# Patient Record
Sex: FEMALE | Race: WHITE | NOT HISPANIC OR LATINO
[De-identification: names, ages, dates, MRNs, and addresses within clinical notes are randomized per-mention and may not be internally consistent; named-entity substitution may affect disease eponyms.]

---

## 2021-04-01 PROBLEM — Z00.00 ENCOUNTER FOR PREVENTIVE HEALTH EXAMINATION: Status: ACTIVE | Noted: 2021-04-01

## 2021-04-08 ENCOUNTER — APPOINTMENT (OUTPATIENT)
Dept: SURGERY | Facility: CLINIC | Age: 61
End: 2021-04-08
Payer: COMMERCIAL

## 2021-04-08 VITALS
TEMPERATURE: 98 F | DIASTOLIC BLOOD PRESSURE: 82 MMHG | OXYGEN SATURATION: 98 % | BODY MASS INDEX: 28.79 KG/M2 | SYSTOLIC BLOOD PRESSURE: 157 MMHG | RESPIRATION RATE: 16 BRPM | HEIGHT: 68 IN | WEIGHT: 190 LBS | HEART RATE: 99 BPM

## 2021-04-08 DIAGNOSIS — F32.9 ANXIETY DISORDER, UNSPECIFIED: ICD-10-CM

## 2021-04-08 DIAGNOSIS — Z80.3 FAMILY HISTORY OF MALIGNANT NEOPLASM OF BREAST: ICD-10-CM

## 2021-04-08 DIAGNOSIS — Z80.0 FAMILY HISTORY OF MALIGNANT NEOPLASM OF DIGESTIVE ORGANS: ICD-10-CM

## 2021-04-08 DIAGNOSIS — Z86.69 PERSONAL HISTORY OF OTHER DISEASES OF THE NERVOUS SYSTEM AND SENSE ORGANS: ICD-10-CM

## 2021-04-08 DIAGNOSIS — Z80.42 FAMILY HISTORY OF MALIGNANT NEOPLASM OF PROSTATE: ICD-10-CM

## 2021-04-08 DIAGNOSIS — Z82.49 FAMILY HISTORY OF ISCHEMIC HEART DISEASE AND OTHER DISEASES OF THE CIRCULATORY SYSTEM: ICD-10-CM

## 2021-04-08 DIAGNOSIS — F41.9 ANXIETY DISORDER, UNSPECIFIED: ICD-10-CM

## 2021-04-08 PROCEDURE — 46600 DIAGNOSTIC ANOSCOPY SPX: CPT

## 2021-04-08 PROCEDURE — 99072 ADDL SUPL MATRL&STAF TM PHE: CPT

## 2021-04-08 PROCEDURE — 99203 OFFICE O/P NEW LOW 30 MIN: CPT | Mod: 25

## 2021-04-08 NOTE — CONSULT LETTER
[Dear  ___] : Dear ~ARLIN, [Consult Letter:] : I had the pleasure of evaluating your patient, [unfilled]. [Please see my note below.] : Please see my note below. [Consult Closing:] : Thank you very much for allowing me to participate in the care of this patient.  If you have any questions, please do not hesitate to contact me. [Sincerely,] : Sincerely, [DrGemma  ___] : Dr. DC [FreeTextEntry2] : Dr. Carmine Rodriguez [FreeTextEntry3] : Reese Maciel M.D., FABIO.[email or web address]., F.[email or web address].S.ANAIRGemmaS.\[facility] Chief Colorectal Clinical Services, [facility]

## 2021-04-08 NOTE — ASSESSMENT
[FreeTextEntry1] : I have seen and evaluated patient and have corroborated all nursing input into this note.  Patient with grade 4 hemorrhoid prolapse and large external tags.  Unfortunately surgery is the only successful treatment option for this degree of hemorrhoid pathology.  Indications, risk, benefits, alternatives for hemorrhoidectomy reviewed including but not limited to bleeding, infection, recurrence, pain, tags, fissure, fistula, incontinence, and stenosis.  All questions were answered. The patient stated that she would like to schedule the surgery and she will make the arrangements with my office

## 2021-04-08 NOTE — PHYSICAL EXAM
[Thyroid] : the thyroid was normal [Normal Breath Sounds] : Normal breath sounds [Normal Heart Sounds] : normal heart sounds [Normal Rate and Rhythm] : normal rate and rhythm [No Edema] : No edema [No Rash or Lesion] : No rash or lesion [Alert] : alert [Oriented to Person] : oriented to person [Oriented to Place] : oriented to place [Oriented to Time] : oriented to time [Anxious] : anxious [Abdomen Masses] : No abdominal masses [Abdomen Tenderness] : ~T No ~M abdominal tenderness [JVD] : no jugular venous distention  [Wheezing] : no wheezing was heard [de-identified] : Appears well nourished, in no acute distress [de-identified] : WNL [de-identified] : Full ROM [FreeTextEntry1] : Perianal inspection demonstrated external tags and prolapse of the right anterior hemorrhoid with associated mucosal prolapse.  The prolapsing tissue was partially reducible.  Anoscopy confirmed large internal hemorrhoids with squamous metaplasia consistent with prolapse.

## 2021-04-08 NOTE — HISTORY OF PRESENT ILLNESS
[FreeTextEntry1] : The patient is a 60 year old female here for an evaluation of rectal bleeding/pain. Patient is s/p hemorrhoidectomy 30 years ago. Patient reports intermittent BRBPR that can sometimes occur even without bowel movements. Wears a pad daily but only sees blood about once a week. Also reports prolapsing hemorrhoidal tissue that she attempts to manually reduce but is unsuccessful in doing so. Denies rectal pain. Takes a stool softener every day. Has 1-2 normal formed BMs daily. \par The patient had a colonoscopy on 2/22/21 by Dr. Bruno Arizmendi. Findings demonstrate: Rectal prolapse, distal 5 mm sessile hyperplastic polyp of size 5 mm. Polypectomy done by cold snare. Proximal: 1 cm sessile, flat hyperplastic polyp biopsy taken. Site tattooed x2cc. Flat 3mm hyperplastic polyp, excision biopsy at 15 cm. Flat polyp 2 mm, excision biopsy at 5 cm. FIrst degree internal hemorrhoids. Sessile 3 mm polyp in the sigmoid. Pedunculated 6 mm polyp in the descending colon (Tubular adenoma).

## 2021-04-27 ENCOUNTER — OUTPATIENT (OUTPATIENT)
Dept: OUTPATIENT SERVICES | Facility: HOSPITAL | Age: 61
LOS: 1 days | End: 2021-04-27
Payer: COMMERCIAL

## 2021-04-27 VITALS
OXYGEN SATURATION: 97 % | RESPIRATION RATE: 18 BRPM | DIASTOLIC BLOOD PRESSURE: 74 MMHG | HEIGHT: 68 IN | WEIGHT: 197.98 LBS | TEMPERATURE: 99 F | SYSTOLIC BLOOD PRESSURE: 168 MMHG | HEART RATE: 94 BPM

## 2021-04-27 DIAGNOSIS — Z01.818 ENCOUNTER FOR OTHER PREPROCEDURAL EXAMINATION: ICD-10-CM

## 2021-04-27 DIAGNOSIS — Z98.890 OTHER SPECIFIED POSTPROCEDURAL STATES: Chronic | ICD-10-CM

## 2021-04-27 DIAGNOSIS — K64.3 FOURTH DEGREE HEMORRHOIDS: ICD-10-CM

## 2021-04-27 DIAGNOSIS — K64.4 RESIDUAL HEMORRHOIDAL SKIN TAGS: ICD-10-CM

## 2021-04-27 PROCEDURE — G0463: CPT

## 2021-04-27 NOTE — H&P PST ADULT - NSICDXPASTSURGICALHX_GEN_ALL_CORE_FT
PAST SURGICAL HISTORY:  H/O colonoscopy     H/O hemorrhoidectomy 1990    Status post subacromial decompression left shoulder, 1999

## 2021-04-27 NOTE — H&P PST ADULT - CHARACTER OF SYSTOLIC MURMUR
Mr. Phelps,    It was a pleasure seeing you for your physical examination.  I wanted to get back to you with your test results.  I have enclosed a copy for your review.      I am happy to report that your cbc or complete blood count is normal with no signs of anemia, leukemia or platelet abnormalities.  Your chemistry panel shows no signs of diabetes.  Your blood salts, kidney tests, liver tests, hiv test, urine, and proteins are all fine.    Your total cholesterol is 173 with the normal range being below 200.  Your HDL or good cholesterol is 63 with the normal range being above 40.  Your LDL or bad cholesterol is 90 with the normal range being below 130.  These numbers are very good.    I am happy to bring you this overall excellent report.  Please get the ultrasound and ct and let's see what those show.  If you have any questions please call me.    Joey Mike M.D.                   murmur loudness: I/VI/upper lsb

## 2021-04-27 NOTE — H&P PST ADULT - NSICDXPASTMEDICALHX_GEN_ALL_CORE_FT
PAST MEDICAL HISTORY:  Cardiac murmur     Dense breasts     Hemorrhoids     Osteopenia     Precancerous lesion Polyp    Vision loss right eye, sees peripheral vision only

## 2021-04-27 NOTE — H&P PST ADULT - NSICDXFAMILYHX_GEN_ALL_CORE_FT
FAMILY HISTORY:  Mother  Still living? Unknown  Family history of valvular heart disease, Age at diagnosis: Age Unknown  FH: breast cancer, Age at diagnosis: Age Unknown  FH: type 2 diabetes, Age at diagnosis: Age Unknown    Grandparent  Still living? Unknown  FH: type 2 diabetes, Age at diagnosis: Age Unknown

## 2021-04-27 NOTE — H&P PST ADULT - ENMT COMMENTS
right lower tooth s/p root canal on 4/27/21 d/t "chipped tooth"; denies infection not on antibiotics

## 2021-04-27 NOTE — H&P PST ADULT - NSANTHOSAYNRD_GEN_A_CORE
No. ARIAS screening performed.  STOP BANG Legend: 0-2 = LOW Risk; 3-4 = INTERMEDIATE Risk; 5-8 = HIGH Risk

## 2021-04-27 NOTE — H&P PST ADULT - HISTORY OF PRESENT ILLNESS
59 yo female in NAD presents to PST prior to scheduled Hemorrhoidectomy on 5/4/21 with Dr. Reese Maciel. PMHx includes heart murmur, palpitations, hemorrhoids s/p hemorrhoidectomy (1990). osteopenia, anxiety/depression. Patient reports she had a colonoscopy recently which revealed precancerous polyps (which were removed) and hemorrhoids. Patient reports intermittent rectal bleeding after defecation. She denies fevers, chills, chest pain, melena, abdominal pain, nausea, vomiting. She was recently evaluated by Dr. Maciel and the above surgery was recommended.      Cardiac evaluation with Dr. Piper Griffin.  Medical evaluation with Dr. Nikita Jean Baptiste - patient had labs drawn w/ PCP; elevated calcium d/t supplementation. Patient discontinued calcium and went to get labs repeated on 4/26/21.    COVID19 PCR 5/1/21 at [facility].

## 2021-04-27 NOTE — H&P PST ADULT - DOES PATIENT HAVE ADVANCE DIRECTIVE
CC providers:  Annamarie Sosa MD  [address]  [address]  [facility] [address]  VIA In [place] No

## 2021-04-27 NOTE — H&P PST ADULT - NSICDXPROBLEM_GEN_ALL_CORE_FT
PROBLEM DIAGNOSES  Problem: Fourth degree hemorrhoids  Assessment and Plan: Hemorrhoidectomy on 5/4/21 with Dr. Reese Maciel.  Pre-op instructions given. Questions answered.  COVID19 PCR at [facility] on 5/1/21.  Medical and cardiac eval prior to surgery.

## 2021-05-04 ENCOUNTER — APPOINTMENT (OUTPATIENT)
Dept: SURGERY | Facility: HOSPITAL | Age: 61
End: 2021-05-04

## 2022-02-16 PROBLEM — R01.1 CARDIAC MURMUR, UNSPECIFIED: Chronic | Status: ACTIVE | Noted: 2021-04-27

## 2022-02-16 PROBLEM — R92.2 INCONCLUSIVE MAMMOGRAM: Chronic | Status: ACTIVE | Noted: 2021-04-27

## 2022-02-16 PROBLEM — M85.80 OTHER SPECIFIED DISORDERS OF BONE DENSITY AND STRUCTURE, UNSPECIFIED SITE: Chronic | Status: ACTIVE | Noted: 2021-04-27

## 2022-02-16 PROBLEM — H54.7 UNSPECIFIED VISUAL LOSS: Chronic | Status: ACTIVE | Noted: 2021-04-27

## 2022-03-09 ENCOUNTER — APPOINTMENT (OUTPATIENT)
Dept: SURGERY | Facility: CLINIC | Age: 62
End: 2022-03-09
Payer: COMMERCIAL

## 2022-03-09 VITALS
WEIGHT: 190 LBS | HEIGHT: 68 IN | HEART RATE: 81 BPM | RESPIRATION RATE: 18 BRPM | BODY MASS INDEX: 28.79 KG/M2 | SYSTOLIC BLOOD PRESSURE: 164 MMHG | DIASTOLIC BLOOD PRESSURE: 81 MMHG | TEMPERATURE: 97.6 F | OXYGEN SATURATION: 95 %

## 2022-03-09 PROCEDURE — 46600 DIAGNOSTIC ANOSCOPY SPX: CPT

## 2022-03-09 PROCEDURE — 99214 OFFICE O/P EST MOD 30 MIN: CPT | Mod: 25

## 2022-03-09 RX ORDER — ZINC SULFATE 50(220)MG
CAPSULE ORAL
Refills: 0 | Status: ACTIVE | COMMUNITY

## 2022-03-09 RX ORDER — WARFARIN 4 MG/1
TABLET ORAL
Refills: 0 | Status: ACTIVE | COMMUNITY

## 2022-03-09 RX ORDER — AMLODIPINE BESYLATE 5 MG/1
5 TABLET ORAL
Refills: 0 | Status: ACTIVE | COMMUNITY

## 2022-03-09 RX ORDER — DOCUSATE SODIUM 100 MG/1
100 CAPSULE ORAL
Refills: 0 | Status: ACTIVE | COMMUNITY

## 2022-03-09 RX ORDER — METOPROLOL SUCCINATE 200 MG/1
200 TABLET, EXTENDED RELEASE ORAL
Refills: 0 | Status: ACTIVE | COMMUNITY

## 2022-03-09 RX ORDER — ZINC SULFATE 50(220)MG
CAPSULE ORAL
Refills: 0 | Status: DISCONTINUED | COMMUNITY
End: 2022-03-09

## 2022-03-09 RX ORDER — ASPIRIN 81 MG
81 TABLET, DELAYED RELEASE (ENTERIC COATED) ORAL
Refills: 0 | Status: ACTIVE | COMMUNITY

## 2022-03-09 NOTE — HISTORY OF PRESENT ILLNESS
[FreeTextEntry1] : Urszula is a 61 year old female here for follow up visit.\par \par Colonoscopy on 2/22/21 by Dr. Ugo Carr. - Rectal prolapse, distal 5 mm sessile hyperplastic polyp of size 5 mm. Polypectomy done by cold snare. Proximal: 1 cm sessile, flat hyperplastic polyp biopsy taken. Site tattooed x2cc. Flat 3mm hyperplastic polyp, excision biopsy at 15 cm. Flat polyp 2 mm, excision biopsy at 5 cm. FIrst degree internal hemorrhoids. Sessile 3 mm polyp in the sigmoid. Pedunculated 6 mm polyp in the descending colon. \par Pathology; A. rectum, distal, polyp, polypectomy- hyperplastic polyp. B. colon, descending polyp, polypectomy- tubular adenoma. C. colon, sigmoid, polyp, excision biopsy- mucosal polyp with hyperplastic changes, no adenomatous changes seen. D. Rectum, proximal, biopsy- hyperplastic polyp. E. Rectum, polyp at 15 cm from anal verge, excision biopsy- hyperplastic polyp. F. Rectum, polyp 5 cm from anal verge, excision- hyperplastic polyp. \par  \par Last seen 4/8/21 - I have seen and evaluated patient and have corroborated all nursing input into this note. Patient with grade 4 hemorrhoid prolapse and large external tags. Unfortunately surgery is the only successful treatment option for this degree of hemorrhoid pathology. Indications, risk, benefits, alternatives for hemorrhoidectomy reviewed including but not limited to bleeding, infection, recurrence, pain, tags, fissure, fistula, incontinence, and stenosis. All questions were answered. The patient stated that she would like to schedule the surgery and she will make the arrangements with my office.\par \par Today pt reports discomfort. 1-2 formed BMs daily, no straining, takes Colace daily, no pain, occasional BRB dripping into toilet and on toilet paper. Last episode of rectal bleeding 3/9/22. Occasional BRB found in underwear, wears pad for protection. Denies episodes of incontinence. Occasional tissue popping out that goes back itself. External swollen tissue the size of a grape, occasional burning/ itching discomfort. Good appetite, no nausea, no vomiting. Denies fever and chills. Pt on Warfarin and Aspirin 81 mg.

## 2022-03-09 NOTE — ASSESSMENT
[FreeTextEntry1] : Patient with severe hemorrhoid symptoms.  Unfortunately surgery is her only option.  The significant risk of postoperative bleeding while on anticoagulation was discussed.  The patient is aware this risk continues for 2 to 3 weeks after surgery and that she would need to seek emergent medical attention at the hospital which is closest to her if bleeding occurs.  Other indications, risk, benefits, alternatives for hemorrhoidectomy reviewed including but not limited to infection, recurrence, pain, tags, fissure, fistula, stenosis, and anal incontinence.  All questions answered.

## 2022-03-09 NOTE — PHYSICAL EXAM
[FreeTextEntry1] : Perianal inspection demonstrated large external tags.  Digital exam and anoscopy confirmed large internal hemorrhoids with extensive squamous metaplasia consistent with prolapse.\par \par \par \par Anoscopy performed to evaluate internal hemorrhoids.  No sedation required.

## 2022-03-22 ENCOUNTER — TRANSCRIPTION ENCOUNTER (OUTPATIENT)
Age: 62
End: 2022-03-22

## 2022-03-29 ENCOUNTER — OUTPATIENT (OUTPATIENT)
Dept: OUTPATIENT SERVICES | Facility: HOSPITAL | Age: 62
LOS: 1 days | End: 2022-03-29
Payer: COMMERCIAL

## 2022-03-29 ENCOUNTER — APPOINTMENT (OUTPATIENT)
Age: 62
End: 2022-03-29
Payer: COMMERCIAL

## 2022-03-29 VITALS
OXYGEN SATURATION: 95 % | DIASTOLIC BLOOD PRESSURE: 71 MMHG | HEIGHT: 68 IN | HEART RATE: 86 BPM | TEMPERATURE: 98 F | WEIGHT: 214.95 LBS | SYSTOLIC BLOOD PRESSURE: 125 MMHG | RESPIRATION RATE: 18 BRPM

## 2022-03-29 DIAGNOSIS — I10 ESSENTIAL (PRIMARY) HYPERTENSION: ICD-10-CM

## 2022-03-29 DIAGNOSIS — Z86.79 PERSONAL HISTORY OF OTHER DISEASES OF THE CIRCULATORY SYSTEM: ICD-10-CM

## 2022-03-29 DIAGNOSIS — K64.4 RESIDUAL HEMORRHOIDAL SKIN TAGS: ICD-10-CM

## 2022-03-29 DIAGNOSIS — Z95.2 PRESENCE OF PROSTHETIC HEART VALVE: Chronic | ICD-10-CM

## 2022-03-29 DIAGNOSIS — Z95.2 PRESENCE OF PROSTHETIC HEART VALVE: ICD-10-CM

## 2022-03-29 DIAGNOSIS — K64.3 FOURTH DEGREE HEMORRHOIDS: ICD-10-CM

## 2022-03-29 DIAGNOSIS — R01.1 CARDIAC MURMUR, UNSPECIFIED: ICD-10-CM

## 2022-03-29 DIAGNOSIS — Z01.818 ENCOUNTER FOR OTHER PREPROCEDURAL EXAMINATION: ICD-10-CM

## 2022-03-29 DIAGNOSIS — E78.5 HYPERLIPIDEMIA, UNSPECIFIED: ICD-10-CM

## 2022-03-29 DIAGNOSIS — Z98.890 OTHER SPECIFIED POSTPROCEDURAL STATES: Chronic | ICD-10-CM

## 2022-03-29 DIAGNOSIS — Z86.79 OTHER SPECIFIED POSTPROCEDURAL STATES: ICD-10-CM

## 2022-03-29 DIAGNOSIS — K62.5 HEMORRHAGE OF ANUS AND RECTUM: ICD-10-CM

## 2022-03-29 DIAGNOSIS — Z98.890 OTHER SPECIFIED POSTPROCEDURAL STATES: ICD-10-CM

## 2022-03-29 DIAGNOSIS — K62.89 OTHER SPECIFIED DISEASES OF ANUS AND RECTUM: ICD-10-CM

## 2022-03-29 DIAGNOSIS — Z78.9 OTHER SPECIFIED HEALTH STATUS: ICD-10-CM

## 2022-03-29 PROCEDURE — G0463: CPT

## 2022-03-29 PROCEDURE — 99204 OFFICE O/P NEW MOD 45 MIN: CPT

## 2022-03-29 RX ORDER — LISINOPRIL 30 MG/1
TABLET ORAL
Refills: 0 | Status: ACTIVE | COMMUNITY

## 2022-03-29 RX ORDER — SERTRALINE 25 MG/1
TABLET, FILM COATED ORAL
Refills: 0 | Status: ACTIVE | COMMUNITY

## 2022-03-29 RX ORDER — CALCIUM CARBONATE/VITAMIN D3 600 MG-20
TABLET ORAL
Refills: 0 | Status: DISCONTINUED | COMMUNITY
End: 2022-03-29

## 2022-03-29 RX ORDER — TIZANIDINE 2 MG/1
2 TABLET ORAL
Refills: 0 | Status: ACTIVE | COMMUNITY

## 2022-03-29 RX ORDER — UBIDECARENONE/VIT E ACET 100MG-5
CAPSULE ORAL
Refills: 0 | Status: ACTIVE | COMMUNITY

## 2022-03-29 RX ORDER — CALCIUM CARBONATE 500(1250)
2 TABLET ORAL
Qty: 0 | Refills: 0 | DISCHARGE
End: 2021-04-20

## 2022-03-29 RX ORDER — L.ACIDOPH/B.ANIMALIS/B.LONGUM 15B CELL
1 CAPSULE ORAL
Qty: 0 | Refills: 0 | DISCHARGE

## 2022-03-29 RX ORDER — DOCUSATE SODIUM 100 MG/1
TABLET ORAL
Refills: 0 | Status: DISCONTINUED | COMMUNITY
End: 2022-03-29

## 2022-03-29 NOTE — H&P PST ADULT - PROBLEM SELECTOR PLAN 2
Pt will be bridging with Lovenox. Schedule no coumadin 3/29/22 start Lovenox 3/30-4/3 BID  4/4 only AM dose no Lovenox on 4/5

## 2022-03-29 NOTE — H&P PST ADULT - HISTORY OF PRESENT ILLNESS
61yr old female with grade 4 prolapsed internal hemorrhoids with rectal bleeding and pain. Coming in for IR for angiogram internal hemorrhoidal embolization. 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 Pt will be bridging with Lovenox for surgery.  Medical eval on chart. Hx of obesity HTN controlled HLD.    Note; covid test 4/2/22 [facility] 61yr old female with grade 4 prolapsed internal hemorrhoids with rectal bleeding and pain. Coming in for IR for angiogram internal hemorrhoidal embolization. 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 Pt will be bridging with Lovenox for surgery.  Medical eval on chart. Hx of obesity HTN controlled HLD. Endocarditis prophylaxis ordered.    Note; [facility]

## 2022-03-29 NOTE — H&P PST ADULT - NSICDXPASTMEDICALHX_GEN_ALL_CORE_FT
PAST MEDICAL HISTORY:  Anxiety     Cardiac murmur     Dense breasts     Hemorrhoids grade 4 internal prolapsed    Hyperlipidemia     Obesity     Osteopenia     Precancerous lesion Polyp removed colon 2/2021    Vision loss right eye, sees peripheral vision only

## 2022-03-29 NOTE — H&P PST ADULT - FALL HARM RISK - UNIVERSAL INTERVENTIONS
Bed in lowest position, wheels locked, appropriate side rails in place/Call bell, personal items and telephone in reach/Instruct patient to call for assistance before getting out of bed or chair/Non-slip footwear when patient is out of bed/[place] to call system/Physically safe environment - no spills, clutter or unnecessary equipment/Purposeful Proactive Rounding/Room/bathroom lighting operational, light cord in reach

## 2022-03-29 NOTE — H&P PST ADULT - NSICDXPASTSURGICALHX_GEN_ALL_CORE_FT
PAST SURGICAL HISTORY:  H/O colonoscopy     H/O hemorrhoidectomy 1991    History of aortic valve replacement ascending aorta and aortic arch aneurysm  5/17/2021    Status post subacromial decompression left shoulder, 1999

## 2022-04-01 PROBLEM — F41.9 ANXIETY DISORDER, UNSPECIFIED: Chronic | Status: ACTIVE | Noted: 2022-03-29

## 2022-04-01 PROBLEM — D49.9 NEOPLASM OF UNSPECIFIED BEHAVIOR OF UNSPECIFIED SITE: Chronic | Status: ACTIVE | Noted: 2021-04-27

## 2022-04-01 PROBLEM — K64.9 UNSPECIFIED HEMORRHOIDS: Chronic | Status: ACTIVE | Noted: 2021-04-27

## 2022-04-01 PROBLEM — E66.9 OBESITY, UNSPECIFIED: Chronic | Status: ACTIVE | Noted: 2022-03-29

## 2022-04-01 PROBLEM — E78.5 HYPERLIPIDEMIA, UNSPECIFIED: Chronic | Status: ACTIVE | Noted: 2022-03-29

## 2022-04-02 ENCOUNTER — OUTPATIENT (OUTPATIENT)
Dept: OUTPATIENT SERVICES | Facility: HOSPITAL | Age: 62
LOS: 1 days | End: 2022-04-02
Payer: COMMERCIAL

## 2022-04-02 DIAGNOSIS — Z95.2 PRESENCE OF PROSTHETIC HEART VALVE: Chronic | ICD-10-CM

## 2022-04-02 DIAGNOSIS — Z98.890 OTHER SPECIFIED POSTPROCEDURAL STATES: Chronic | ICD-10-CM

## 2022-04-02 DIAGNOSIS — Z11.52 ENCOUNTER FOR SCREENING FOR COVID-19: ICD-10-CM

## 2022-04-02 LAB — SARS-COV-2 RNA SPEC QL NAA+PROBE: SIGNIFICANT CHANGE UP

## 2022-04-02 PROCEDURE — U0005: CPT

## 2022-04-02 PROCEDURE — U0003: CPT

## 2022-04-02 PROCEDURE — C9803: CPT

## 2022-04-05 ENCOUNTER — APPOINTMENT (OUTPATIENT)
Dept: SURGERY | Facility: HOSPITAL | Age: 62
End: 2022-04-05

## 2022-04-05 ENCOUNTER — OUTPATIENT (OUTPATIENT)
Dept: OUTPATIENT SERVICES | Facility: HOSPITAL | Age: 62
LOS: 1 days | End: 2022-04-05
Payer: COMMERCIAL

## 2022-04-05 ENCOUNTER — RESULT REVIEW (OUTPATIENT)
Age: 62
End: 2022-04-05

## 2022-04-05 ENCOUNTER — TRANSCRIPTION ENCOUNTER (OUTPATIENT)
Age: 62
End: 2022-04-05

## 2022-04-05 VITALS
SYSTOLIC BLOOD PRESSURE: 122 MMHG | HEART RATE: 63 BPM | DIASTOLIC BLOOD PRESSURE: 65 MMHG | RESPIRATION RATE: 16 BRPM | OXYGEN SATURATION: 96 %

## 2022-04-05 VITALS
HEART RATE: 76 BPM | OXYGEN SATURATION: 96 % | HEIGHT: 68 IN | RESPIRATION RATE: 18 BRPM | WEIGHT: 210.1 LBS | SYSTOLIC BLOOD PRESSURE: 141 MMHG | TEMPERATURE: 98 F | DIASTOLIC BLOOD PRESSURE: 72 MMHG

## 2022-04-05 DIAGNOSIS — K62.5 HEMORRHAGE OF ANUS AND RECTUM: ICD-10-CM

## 2022-04-05 DIAGNOSIS — K64.3 FOURTH DEGREE HEMORRHOIDS: ICD-10-CM

## 2022-04-05 DIAGNOSIS — Z98.890 OTHER SPECIFIED POSTPROCEDURAL STATES: Chronic | ICD-10-CM

## 2022-04-05 DIAGNOSIS — Z95.2 PRESENCE OF PROSTHETIC HEART VALVE: Chronic | ICD-10-CM

## 2022-04-05 DIAGNOSIS — K64.4 RESIDUAL HEMORRHOIDAL SKIN TAGS: ICD-10-CM

## 2022-04-05 DIAGNOSIS — K62.89 OTHER SPECIFIED DISEASES OF ANUS AND RECTUM: ICD-10-CM

## 2022-04-05 PROCEDURE — C1887: CPT

## 2022-04-05 PROCEDURE — C1769: CPT

## 2022-04-05 PROCEDURE — 37244 VASC EMBOLIZE/OCCLUDE BLEED: CPT

## 2022-04-05 PROCEDURE — 76937 US GUIDE VASCULAR ACCESS: CPT

## 2022-04-05 PROCEDURE — C2628: CPT

## 2022-04-05 PROCEDURE — 76937 US GUIDE VASCULAR ACCESS: CPT | Mod: 26

## 2022-04-05 PROCEDURE — 36247 INS CATH ABD/L-EXT ART 3RD: CPT

## 2022-04-05 PROCEDURE — C1760: CPT

## 2022-04-05 PROCEDURE — C1894: CPT

## 2022-04-05 RX ORDER — DOCUSATE SODIUM 100 MG
1 CAPSULE ORAL
Qty: 0 | Refills: 0 | DISCHARGE

## 2022-04-05 RX ORDER — ONDANSETRON 8 MG/1
4 TABLET, FILM COATED ORAL ONCE
Refills: 0 | Status: DISCONTINUED | OUTPATIENT
Start: 2022-04-05 | End: 2022-04-19

## 2022-04-05 RX ORDER — ACETAMINOPHEN 500 MG
1000 TABLET ORAL ONCE
Refills: 0 | Status: DISCONTINUED | OUTPATIENT
Start: 2022-04-05 | End: 2022-04-19

## 2022-04-05 RX ORDER — TIZANIDINE 4 MG/1
2 TABLET ORAL
Qty: 0 | Refills: 0 | DISCHARGE

## 2022-04-05 NOTE — ASU DISCHARGE PLAN (ADULT/PEDIATRIC) - NURSING INSTRUCTIONS
Please feel free to contact us at [phone] if any problems arise. After 6PM, Monday through Friday, on weekends and on holidays, please call [phone] and ask for the radiology resident on call to be paged.

## 2022-04-05 NOTE — ASU PATIENT PROFILE, ADULT - VISION (WITH CORRECTIVE LENSES IF THE PATIENT USUALLY WEARS THEM):
Normal vision: sees adequately in most situations; can see medication labels, newsprint
As certified below, I, or a nurse practitioner or physician assistant working with me, had a face-to-face encounter that meets the physician face-to-face encounter requirements.

## 2022-04-05 NOTE — ASU DISCHARGE PLAN (ADULT/PEDIATRIC) - NS MD DC FALL RISK RISK
For information on Fall & Injury Prevention, visit: [email or web address] OR  [email or web address] OR  https://www.cdc.gov/steadi/patient.html

## 2022-04-05 NOTE — ASU DISCHARGE PLAN (ADULT/PEDIATRIC) - B. DRINK ALCOHOL, BEER, OR WINE
Problem: Thermoregulation  Goal: Body temperature maintained within normal range  Outcome: Outcome Met, Continue evaluating goal progress toward completion  Infant was in 27 degree isolette when arrived for shift at 0700. Transitioned infant to open crib at 1300 as temps were stable and infant is >1800g. Although infant is not yet 34 weeks, MD is okay with infant in open crib. Temps since raising the top of the isolette have been stable.       Statement Selected

## 2022-04-05 NOTE — PROCEDURE NOTE - PROCEDURE FINDINGS AND DETAILS
Patent right common femoral artery. ALEXANDR catheterized and angiography confirms presence of internal hemorrhoids. Embolization performed with microcoils. Right groin hemostasis with Celt device and manual compression.

## 2022-04-05 NOTE — ASU DISCHARGE PLAN (ADULT/PEDIATRIC) - ASU DC SPECIAL INSTRUCTIONSFT
You underwent a Internal Hemorrhoidal Embolization procedure with Dr Small on 4/5/2022.    Follow-up Visits:  - Please call the Interventional Radiology office @ [phone] to schedule a two week follow up consultation with your Dr. Small. Due to the current pandemic, this may be conducted via Telehealth services, for your safety.      Questions or Concerns:  - Should you have any questions or concerns regarding the above, please call IR at [phone] or [phone] Mon - Fri 8 am -4 pm.  - If you develop a problem that you believe requires IMMEDIATE attention, please go to your NEAREST Emergency Room or call 911***   - If you believe your problem can safely wait until you speak to a Doctor, please call your Urologist & IR at [phone]. After 6 pm on weekdays, or on weekends or holidays, please call [facility] at [phone] and ask for the" Interventional Radiology Resident on call"  -FEVER of 101 F   -SEVERE ABDOMINAL PAIN UNRELIEVED BY PAIN MEDICATION  -PERSISTENT NAUSEA OR VOMITING or the inability to tolerate liquids or solid foods   -RIGHT GROIN  ACCESS SITE BLEEDING / expanding bruising / severe pain / hardened area (like a golf ball)    -RIGHT LEG NUMBNESS, tingling or inability to move your leg

## 2022-04-05 NOTE — PRE PROCEDURE NOTE - PRE PROCEDURE EVALUATION
Interventional Radiology Pre-Procedure Note    Diagnosis/Indication: Patient is a 61y old  Female who presents with persistent bleeding from internal hemorrhoids. Patient not a surgical candidate due to need for anticoagulation due to mechanical valve.     PAST MEDICAL & SURGICAL HISTORY:  Hemorrhoids  grade 4 internal prolapsed    Osteopenia    Dense breasts    Cardiac murmur    Precancerous lesion  Polyp removed colon 2/2021    Vision loss  right eye, sees peripheral vision only    Obesity    Anxiety    Hyperlipidemia    Status post subacromial decompression  left shoulder, 1999    H/O colonoscopy    H/O hemorrhoidectomy  1991    History of aortic valve replacement  ascending aorta and aortic arch aneurysm  5/17/2021         Allergies: No Known Drug Allergies  walnut (Hives)      LABS:  Reviewed in chart.             Procedure/ risks/ benefits were explained, informed consent obtained from patient, verbalizes understanding.

## 2022-04-05 NOTE — ASU PATIENT PROFILE, ADULT - FALL HARM RISK - UNIVERSAL INTERVENTIONS
Bed in lowest position, wheels locked, appropriate side rails in place/Call bell, personal items and telephone in reach/Instruct patient to call for assistance before getting out of bed or chair/Non-slip footwear when patient is out of bed/[place] to call system/Physically safe environment - no spills, clutter or unnecessary equipment/Purposeful Proactive Rounding/Room/bathroom lighting operational, light cord in reach

## 2022-04-15 DIAGNOSIS — K62.5 HEMORRHAGE OF ANUS AND RECTUM: ICD-10-CM

## 2022-04-19 ENCOUNTER — APPOINTMENT (OUTPATIENT)
Age: 62
End: 2022-04-19
Payer: COMMERCIAL

## 2022-04-19 PROCEDURE — 99442: CPT | Mod: 95

## 2022-05-24 ENCOUNTER — APPOINTMENT (OUTPATIENT)
Age: 62
End: 2022-05-24

## 2022-05-24 DIAGNOSIS — Z87.19 PERSONAL HISTORY OF OTHER DISEASES OF THE DIGESTIVE SYSTEM: ICD-10-CM

## 2022-05-24 DIAGNOSIS — K64.9 UNSPECIFIED HEMORRHOIDS: ICD-10-CM

## 2022-05-24 PROCEDURE — XXXXX: CPT | Mod: 1L

## 2022-05-24 NOTE — PHYSICAL EXAM
[Alert] : alert [No Acute Distress] : no acute distress [Normal Voice/Communication] : normal voice communication [Oriented x3] : oriented to person, place, and time [Normal Insight/Judgement] : insight and judgment were intact [Normal Affect] : the affect was normal [Normal Mood] : the mood was normal [Fully active, able to carry on all pre-disease performance without restriction] : Fully active, able to carry on all pre-disease performance without restriction

## 2022-06-06 ENCOUNTER — NON-APPOINTMENT (OUTPATIENT)
Age: 62
End: 2022-06-06

## 2022-06-07 ENCOUNTER — APPOINTMENT (OUTPATIENT)
Age: 62
End: 2022-06-07

## 2022-06-28 NOTE — HISTORY OF PRESENT ILLNESS
[FreeTextEntry1] : This is a 61 year old female with hx of Aortic Valve replacement (mechanical) on 5/17/21 on coumadin & asa, HTN, HLD, grade 4 prolapsed internal hemorrhoids with rectal bleeding & pain, who presents to IR for f/u consultation s/p angiogram with superior hemorrhoidal artery embolization on 4/5/22.\par \par Ms. Oneill reports that she is still bleeding after bowel movements not precipitated by straining, and also has to wear pads in between bowel movements due to intermittent spotting. Pt reporting intermittent rectal pain/hemorrhoid pain when sitting down, relieved when she sits on a donut.\par \par Patient denies fever, chills, nausea vomiting, shortness of breath, light headedness/dizziness. \par \par \par Colorectal Sx Dr Maciel\par Cardiac Dr Piper Griffin

## 2022-06-28 NOTE — ASSESSMENT
[Other: _____] : [unfilled] [FreeTextEntry1] : This is a 61 year old female with hx of Aortic Valve replacement (mechanical) on 5/17/21 on coumadin & asa, HTN, HLD, grade 4 prolapsed internal hemorrhoids with rectal bleeding & pain, who presents to IR for f/u consultation s/p superior hemorrhoidal artery embolization on 4/5/22.\par \par # Internal Hemorrhoids \par - now s/p superior hemorrhoidal artery embolization on 4/5/22\par - reports minimal improvement- reports that she is still bleeding after bowel movements not precipitated by straining, with associated intermittent rectal pain/hemorrhoid pain.\par - deemed high risk for delayed hemorrhage should hemorrhoidectomy be performed as per Dr Maciel 2/2 need for continuation of anticoagulation regimen for Afib\par - possible need for repeat angiogram +/- internal hemorrhoidal embolization vs surgery\par - pt will follow up with her cardiologist regarding AC management and associated risk for surgery vs repeat angio/embo \par - continue with colorectal f/u as per Dr Maciel\par - will follow up with patient in 2 weeks \par \par Ms. Nino comprehension was confirmed & all questions were asked and answered to her satisfaction.\par IR office contact information was reviewed with the pt should she have any questions, issues or concerns, in the interim. \par

## 2022-06-28 NOTE — REASON FOR VISIT
[Follow-Up: _____] : a [unfilled] follow-up visit [FreeTextEntry1] :  s/p superior hemorrhoidal artery embolization

## 2022-08-18 ENCOUNTER — APPOINTMENT (OUTPATIENT)
Dept: SURGERY | Facility: CLINIC | Age: 62
End: 2022-08-18

## 2022-08-18 VITALS
OXYGEN SATURATION: 98 % | HEART RATE: 85 BPM | SYSTOLIC BLOOD PRESSURE: 147 MMHG | RESPIRATION RATE: 17 BRPM | TEMPERATURE: 97.3 F | DIASTOLIC BLOOD PRESSURE: 79 MMHG

## 2022-08-18 PROCEDURE — 99214 OFFICE O/P EST MOD 30 MIN: CPT | Mod: 25

## 2022-08-18 PROCEDURE — 46600 DIAGNOSTIC ANOSCOPY SPX: CPT

## 2022-08-18 NOTE — HISTORY OF PRESENT ILLNESS
[FreeTextEntry1] : Urszula is a 61 year old female here for follow up visit, to discuss sx. \par \par Colonoscopy on 2/22/21 by Dr. Ugo Carr. - Rectal prolapse, distal 5 mm sessile hyperplastic polyp of size 5 mm. Polypectomy done by cold snare. Proximal: 1 cm sessile, flat hyperplastic polyp biopsy taken. Site tattooed x2cc. Flat 3mm hyperplastic polyp, excision biopsy at 15 cm. Flat polyp 2 mm, excision biopsy at 5 cm. FIrst degree internal hemorrhoids. Sessile 3 mm polyp in the sigmoid. Pedunculated 6 mm polyp in the descending colon. \par \par Pathology; A. rectum, distal, polyp, polypectomy- hyperplastic polyp. B. colon, descending polyp, polypectomy- tubular adenoma. C. colon, sigmoid, polyp, excision biopsy- mucosal polyp with hyperplastic changes, no adenomatous changes seen. D. Rectum, proximal, biopsy- hyperplastic polyp. E. Rectum, polyp at 15 cm from anal verge, excision biopsy- hyperplastic polyp. F. Rectum, polyp 5 cm from anal verge, excision- hyperplastic polyp. \par  \par Last seen on 3/9/22 - Perianal inspection demonstrated large external tags. Digital exam and anoscopy confirmed large internal hemorrhoids with extensive squamous metaplasia consistent with prolapse.\par Anoscopy performed to evaluate internal hemorrhoids. No sedation required.Patient with severe hemorrhoid symptoms. Unfortunately surgery is her only option. The significant risk of postoperative bleeding while on anticoagulation was discussed. The patient is aware this risk continues for 2 to 3 weeks after surgery and that she would need to seek emergent medical attention at the hospital which is closest to her if bleeding occurs. Other indications, risk, benefits, alternatives for hemorrhoidectomy reviewed including but not limited to infection, recurrence, pain, tags, fissure, fistula, stenosis, and anal incontinence. All questions answered. \par  \par Today pt reports 7/10 pain. Daily BMs, soft, no straining, bleeding dripping on toilet and dripping in bath tub at times with pain for 3 years, leakage of stool at times, and feeling swollen tissue on outside. Had hemorrhoids removed 30 years through lasers. Sitz bath and Tylenol with relief. Denies nausea and vomiting. Denies fever and chills. Good appetite. Baby aspirin and warfarin for artificial valve.

## 2022-08-18 NOTE — PHYSICAL EXAM
[FreeTextEntry1] : Perianal inspection demonstrated external tags.  Moderate internal hemorrhoids noted on both digital exam and anoscopy.\par \par \par Anoscopy performed to evaluate internal hemorrhoids.  No sedation required

## 2022-08-18 NOTE — ASSESSMENT
[FreeTextEntry1] : Patient's hemorrhoids reduced completely on today's visit.  Patient reports that she still gets severe swelling and bleeding despite embolization.  She also has intermittent severe pain associated with the prolapse.  Since patient's hemorrhoids are now reducible and are only moderate in size when reduced I recommended a trial of sclerotherapy.  Indications, risk, benefits, alternatives reviewed including but not limited to bleeding, infection, and failure discussed.  Ultimately, if sclerotherapy fails then surgery would be needed.  The risk of postoperative bleeding and multiple episodes of postoperative bleeding requiring emergency suture placement were discussed.  The patient's  was present for the discussion and all questions were answered.  Patient will take antibiotic prophylaxis prior to sclerotherapy.

## 2022-09-01 ENCOUNTER — APPOINTMENT (OUTPATIENT)
Dept: SURGERY | Facility: CLINIC | Age: 62
End: 2022-09-01

## 2022-09-01 VITALS
HEART RATE: 83 BPM | TEMPERATURE: 97.3 F | RESPIRATION RATE: 17 BRPM | DIASTOLIC BLOOD PRESSURE: 80 MMHG | SYSTOLIC BLOOD PRESSURE: 138 MMHG | OXYGEN SATURATION: 95 %

## 2022-09-01 PROCEDURE — 46500 INJECTION INTO HEMORRHOID(S): CPT

## 2022-09-01 NOTE — PHYSICAL EXAM
[FreeTextEntry1] : Perianal inspection revealed external tags.  There was no prolapse on today's exam.  Digital exam and anoscopy confirmed large internal hemorrhoids.\par \par \par Anoscopy performed to evaluate internal hemorrhoids and to perform the sclerotherapy.  No sedation required.

## 2022-09-01 NOTE — HISTORY OF PRESENT ILLNESS
[FreeTextEntry1] : Urszula is a 61 year old female here for sclerotherapy. \par \par Colonoscopy on 2/22/21 by Dr. Ugo Carr. - Rectal prolapse, distal 5 mm sessile hyperplastic polyp of size 5 mm. Polypectomy done by cold snare. Proximal: 1 cm sessile, flat hyperplastic polyp biopsy taken. Site tattooed x2cc. Flat 3mm hyperplastic polyp, excision biopsy at 15 cm. Flat polyp 2 mm, excision biopsy at 5 cm. FIrst degree internal hemorrhoids. Sessile 3 mm polyp in the sigmoid. Pedunculated 6 mm polyp in the descending colon. \par Pathology; A. rectum, distal, polyp, polypectomy- hyperplastic polyp. B. colon, descending polyp, polypectomy- tubular adenoma. C. colon, sigmoid, polyp, excision biopsy- mucosal polyp with hyperplastic changes, no adenomatous changes seen. D. Rectum, proximal, biopsy- hyperplastic polyp. E. Rectum, polyp at 15 cm from anal verge, excision biopsy- hyperplastic polyp. F. Rectum, polyp 5 cm from anal verge, excision- hyperplastic polyp. \par  \par Last seen 8/18/22- Perianal inspection demonstrated external tags. Moderate internal hemorrhoids noted on both digital exam and anoscopy. Anoscopy performed to evaluate internal hemorrhoids. No sedation required. Patient's hemorrhoids reduced completely on today's visit. Patient reports that she still gets severe swelling and bleeding despite embolization. She also has intermittent severe pain associated with the prolapse. Since patient's hemorrhoids are now reducible and are only moderate in size when reduced I recommended a trial of sclerotherapy. Indications, risk, benefits, alternatives reviewed including but not limited to bleeding, infection, and failure discussed. Ultimately, if sclerotherapy fails then surgery would be needed. The risk of postoperative bleeding and multiple episodes of postoperative bleeding requiring emergency suture placement were discussed. The patient's  was present for the discussion and all questions were answered. Patient will take antibiotic prophylaxis prior to sclerotherapy. \par \par Today pt reports 7/10 pain, takes tylenol with relief. Daily BMs, takes stool softener daily, soft, no straining, with bleeding on tp and dripping on bowl (even without BM), no episodes of incontinence, and does feeling swollen tissue and prolapsed tissue (cant be pushed in even with Tucks). Denies nausea and vomiting. Denies fever and chills. Good appetite. Taking Warfarin for artificial heart valve. Took amoxicillin before coming.

## 2022-09-01 NOTE — ASSESSMENT
[FreeTextEntry1] : 1 cc of 5% phenol in cottonseed oil injected into each of the 3 primary hemorrhoids.  Patient will call if any increase in pain fevers or difficulty urinating.  Follow-up in 3 weeks if bleeding continues.

## 2022-09-19 ENCOUNTER — TRANSCRIPTION ENCOUNTER (OUTPATIENT)
Age: 62
End: 2022-09-19

## 2022-09-22 ENCOUNTER — APPOINTMENT (OUTPATIENT)
Dept: SURGERY | Facility: CLINIC | Age: 62
End: 2022-09-22

## 2022-09-29 ENCOUNTER — APPOINTMENT (OUTPATIENT)
Dept: SURGERY | Facility: CLINIC | Age: 62
End: 2022-09-29

## 2022-09-29 VITALS
HEART RATE: 82 BPM | RESPIRATION RATE: 17 BRPM | DIASTOLIC BLOOD PRESSURE: 86 MMHG | SYSTOLIC BLOOD PRESSURE: 157 MMHG | TEMPERATURE: 96.8 F | OXYGEN SATURATION: 94 %

## 2022-09-29 PROCEDURE — 46500 INJECTION INTO HEMORRHOID(S): CPT

## 2022-09-29 NOTE — PHYSICAL EXAM
[FreeTextEntry1] : External tags.  Anoscopy with large internal hemorrhoids.\par \par Anoscopy performed to evaluate internal hemorrhoids and perform sclerotherapy.  No sedation required.

## 2022-09-29 NOTE — ASSESSMENT
[FreeTextEntry1] : Status post embolization and 1 round of sclerotherapy with some improvement.  Repeat sclerotherapy performed today by injecting 1 cc of 5% phenol in cottonseed oil into each hemorrhoid.  Monsel solution applied.  Follow-up as needed.

## 2022-09-29 NOTE — HISTORY OF PRESENT ILLNESS
[FreeTextEntry1] : Urszula is a 61 year old female here for a follow up visit. \par \par Colonoscopy on 2/22/21 by Dr. Ugo Carr. - Rectal prolapse, distal 5 mm sessile hyperplastic polyp of size 5 mm. Polypectomy done by cold snare. Proximal: 1 cm sessile, flat hyperplastic polyp biopsy taken. Site tattooed x2cc. Flat 3mm hyperplastic polyp, excision biopsy at 15 cm. Flat polyp 2 mm, excision biopsy at 5 cm. FIrst degree internal hemorrhoids. Sessile 3 mm polyp in the sigmoid. Pedunculated 6 mm polyp in the descending colon. \par Pathology; A. rectum, distal, polyp, polypectomy- hyperplastic polyp. B. colon, descending polyp, polypectomy- tubular adenoma. C. colon, sigmoid, polyp, excision biopsy- mucosal polyp with hyperplastic changes, no adenomatous changes seen. D. Rectum, proximal, biopsy- hyperplastic polyp. E. Rectum, polyp at 15 cm from anal verge, excision biopsy- hyperplastic polyp. F. Rectum, polyp 5 cm from anal verge, excision- hyperplastic polyp. \par  \par Last seen on 9/1/22 - Perianal inspection revealed external tags. There was no prolapse on today's exam. Digital exam and anoscopy confirmed large internal hemorrhoids. Anoscopy performed to evaluate internal hemorrhoids and to perform the sclerotherapy. No sedation required. 1 cc of 5% phenol in cottonseed oil injected into each of the 3 primary hemorrhoids. Patient will call if any increase in pain fevers or difficulty urinating. Follow-up in 3 weeks if bleeding continues.  \par \par Today pt reports no pain. Daily BMs, soft, no straining, with bleeding dripping onto bowel, on tp, on underwear, no episodes of incontinence, and does feel prolapsed tissue. Denies nausea and vomiting. Denies fever and chills. Good appetite. Previous sclerotherapy worked for about 10 days but bleeding started again. Less pain and burning. Takes warfarin and baby aspirin for artificial heart valve. Also took amoxicillin today. \par

## 2022-11-03 ENCOUNTER — APPOINTMENT (OUTPATIENT)
Dept: SURGERY | Facility: CLINIC | Age: 62
End: 2022-11-03

## 2023-03-08 ENCOUNTER — TRANSCRIPTION ENCOUNTER (OUTPATIENT)
Age: 63
End: 2023-03-08

## 2023-03-09 ENCOUNTER — TRANSCRIPTION ENCOUNTER (OUTPATIENT)
Age: 63
End: 2023-03-09

## 2023-03-29 ENCOUNTER — APPOINTMENT (OUTPATIENT)
Dept: SURGERY | Facility: CLINIC | Age: 63
End: 2023-03-29
Payer: COMMERCIAL

## 2023-03-29 PROCEDURE — 46500 INJECTION INTO HEMORRHOID(S): CPT

## 2023-03-29 PROCEDURE — 99213 OFFICE O/P EST LOW 20 MIN: CPT | Mod: 25

## 2023-03-29 NOTE — PHYSICAL EXAM
[FreeTextEntry1] : Perianal inspection demonstrated tags and internal hemorrhoid prolapse.  Digital exam and anoscopy confirmed large internal hemorrhoids.\par \par Anoscopy performed to evaluate internal hemorrhoids and perform sclerotherapy.  No sedation required.

## 2023-03-29 NOTE — HISTORY OF PRESENT ILLNESS
[FreeTextEntry1] : Urszula is a 61 year old female here for a follow up visit, sclerotherapy\par \par s/p sclerotherapy 09/01/22, 09/29/22 \par \par Colonoscopy on 2/22/21 by Dr. Ugo Carr. - Rectal prolapse, distal 5 mm sessile hyperplastic polyp of size 5 mm. Polypectomy done by cold snare. Proximal: 1 cm sessile, flat hyperplastic polyp biopsy taken. Site tattooed x2cc. Flat 3mm hyperplastic polyp, excision biopsy at 15 cm. Flat polyp 2 mm, excision biopsy at 5 cm. FIrst degree internal hemorrhoids. Sessile 3 mm polyp in the sigmoid. Pedunculated 6 mm polyp in the descending colon. \par Pathology; A. rectum, distal, polyp, polypectomy- hyperplastic polyp. B. colon, descending polyp, polypectomy- tubular adenoma. C. colon, sigmoid, polyp, excision biopsy- mucosal polyp with hyperplastic changes, no adenomatous changes seen. D. Rectum, proximal, biopsy- hyperplastic polyp. E. Rectum, polyp at 15 cm from anal verge, excision biopsy- hyperplastic polyp. F. Rectum, polyp 5 cm from anal verge, excision- hyperplastic polyp. \par \par Last seen 09/29/22 - Status post embolization and 1 round of sclerotherapy with some improvement. Repeat sclerotherapy performed today by injecting 1 cc of 5% phenol in cottonseed oil into each hemorrhoid. Monsel solution applied. Follow-up as needed. \par \par Today pt reports occasional anorectal pain. Daily BMs, soft, no straining, with bleeding dripping onto bowel, on tp, on underwear around Thanksgiving of last year.  The rectal bleeding happened 2-3 times a week, last rectal bleeding was this morning into toilet bowl.  No episodes of incontinence and does feel prolapse tissue.  Denies nausea and vomiting. Denies fever and chills. Good appetite.  Takes warfarin and baby aspirin for artificial heart valve. Also took amoxicillin today.\par \par  05/05/2022 hemorrhoid artery embolization.\par \par \par

## 2023-03-29 NOTE — ASSESSMENT
[FreeTextEntry1] : Patient with recurrent bleeding.  I recommended repeat sclerotherapy.  Patient has had 1 hemorrhoidal artery embolization and 2 prior treatments with sclerotherapy.  Patient agreed.  1 cc of 5% phenol in cottonseed oil was injected into the 3 primary hemorrhoids.  Monsel solution applied.  Follow-up as needed.

## 2023-09-20 ENCOUNTER — APPOINTMENT (OUTPATIENT)
Dept: SURGERY | Facility: CLINIC | Age: 63
End: 2023-09-20
Payer: COMMERCIAL

## 2023-09-20 PROCEDURE — 99214 OFFICE O/P EST MOD 30 MIN: CPT

## 2023-09-28 ENCOUNTER — APPOINTMENT (OUTPATIENT)
Dept: INTERVENTIONAL RADIOLOGY/VASCULAR | Facility: CLINIC | Age: 63
End: 2023-09-28

## 2023-09-28 PROCEDURE — XXXXX: CPT | Mod: 1L

## 2023-10-30 ENCOUNTER — OUTPATIENT (OUTPATIENT)
Dept: OUTPATIENT SERVICES | Facility: HOSPITAL | Age: 63
LOS: 1 days | End: 2023-10-30
Payer: COMMERCIAL

## 2023-10-30 VITALS
WEIGHT: 210.1 LBS | SYSTOLIC BLOOD PRESSURE: 117 MMHG | HEART RATE: 75 BPM | RESPIRATION RATE: 18 BRPM | TEMPERATURE: 98 F | OXYGEN SATURATION: 97 % | DIASTOLIC BLOOD PRESSURE: 74 MMHG | HEIGHT: 68 IN

## 2023-10-30 DIAGNOSIS — Z98.890 OTHER SPECIFIED POSTPROCEDURAL STATES: Chronic | ICD-10-CM

## 2023-10-30 DIAGNOSIS — Z01.818 ENCOUNTER FOR OTHER PREPROCEDURAL EXAMINATION: ICD-10-CM

## 2023-10-30 DIAGNOSIS — K64.8 OTHER HEMORRHOIDS: ICD-10-CM

## 2023-10-30 DIAGNOSIS — K64.9 UNSPECIFIED HEMORRHOIDS: ICD-10-CM

## 2023-10-30 DIAGNOSIS — Z95.2 PRESENCE OF PROSTHETIC HEART VALVE: ICD-10-CM

## 2023-10-30 DIAGNOSIS — Z95.2 PRESENCE OF PROSTHETIC HEART VALVE: Chronic | ICD-10-CM

## 2023-10-30 LAB
ANION GAP SERPL CALC-SCNC: 11 MMOL/L — SIGNIFICANT CHANGE UP (ref 5–17)
ANION GAP SERPL CALC-SCNC: 11 MMOL/L — SIGNIFICANT CHANGE UP (ref 5–17)
BUN SERPL-MCNC: 16 MG/DL — SIGNIFICANT CHANGE UP (ref 7–23)
BUN SERPL-MCNC: 16 MG/DL — SIGNIFICANT CHANGE UP (ref 7–23)
CALCIUM SERPL-MCNC: 10.1 MG/DL — SIGNIFICANT CHANGE UP (ref 8.4–10.5)
CALCIUM SERPL-MCNC: 10.1 MG/DL — SIGNIFICANT CHANGE UP (ref 8.4–10.5)
CHLORIDE SERPL-SCNC: 104 MMOL/L — SIGNIFICANT CHANGE UP (ref 96–108)
CHLORIDE SERPL-SCNC: 104 MMOL/L — SIGNIFICANT CHANGE UP (ref 96–108)
CO2 SERPL-SCNC: 24 MMOL/L — SIGNIFICANT CHANGE UP (ref 22–31)
CO2 SERPL-SCNC: 24 MMOL/L — SIGNIFICANT CHANGE UP (ref 22–31)
CREAT SERPL-MCNC: 0.73 MG/DL — SIGNIFICANT CHANGE UP (ref 0.5–1.3)
CREAT SERPL-MCNC: 0.73 MG/DL — SIGNIFICANT CHANGE UP (ref 0.5–1.3)
EGFR: 92 ML/MIN/1.73M2 — SIGNIFICANT CHANGE UP
EGFR: 92 ML/MIN/1.73M2 — SIGNIFICANT CHANGE UP
GLUCOSE SERPL-MCNC: 103 MG/DL — HIGH (ref 70–99)
GLUCOSE SERPL-MCNC: 103 MG/DL — HIGH (ref 70–99)
HCT VFR BLD CALC: 47.5 % — HIGH (ref 34.5–45)
HCT VFR BLD CALC: 47.5 % — HIGH (ref 34.5–45)
HGB BLD-MCNC: 14.8 G/DL — SIGNIFICANT CHANGE UP (ref 11.5–15.5)
HGB BLD-MCNC: 14.8 G/DL — SIGNIFICANT CHANGE UP (ref 11.5–15.5)
INR BLD: 1.98 RATIO — HIGH (ref 0.85–1.18)
INR BLD: 1.98 RATIO — HIGH (ref 0.85–1.18)
MCHC RBC-ENTMCNC: 28.7 PG — SIGNIFICANT CHANGE UP (ref 27–34)
MCHC RBC-ENTMCNC: 28.7 PG — SIGNIFICANT CHANGE UP (ref 27–34)
MCHC RBC-ENTMCNC: 31.2 GM/DL — LOW (ref 32–36)
MCHC RBC-ENTMCNC: 31.2 GM/DL — LOW (ref 32–36)
MCV RBC AUTO: 92.2 FL — SIGNIFICANT CHANGE UP (ref 80–100)
MCV RBC AUTO: 92.2 FL — SIGNIFICANT CHANGE UP (ref 80–100)
NRBC # BLD: 0 /100 WBCS — SIGNIFICANT CHANGE UP (ref 0–0)
NRBC # BLD: 0 /100 WBCS — SIGNIFICANT CHANGE UP (ref 0–0)
PLATELET # BLD AUTO: 221 K/UL — SIGNIFICANT CHANGE UP (ref 150–400)
PLATELET # BLD AUTO: 221 K/UL — SIGNIFICANT CHANGE UP (ref 150–400)
POTASSIUM SERPL-MCNC: 4.7 MMOL/L — SIGNIFICANT CHANGE UP (ref 3.5–5.3)
POTASSIUM SERPL-MCNC: 4.7 MMOL/L — SIGNIFICANT CHANGE UP (ref 3.5–5.3)
POTASSIUM SERPL-SCNC: 4.7 MMOL/L — SIGNIFICANT CHANGE UP (ref 3.5–5.3)
POTASSIUM SERPL-SCNC: 4.7 MMOL/L — SIGNIFICANT CHANGE UP (ref 3.5–5.3)
PROTHROM AB SERPL-ACNC: 20.4 SEC — HIGH (ref 9.5–13)
PROTHROM AB SERPL-ACNC: 20.4 SEC — HIGH (ref 9.5–13)
RBC # BLD: 5.15 M/UL — SIGNIFICANT CHANGE UP (ref 3.8–5.2)
RBC # BLD: 5.15 M/UL — SIGNIFICANT CHANGE UP (ref 3.8–5.2)
RBC # FLD: 13.9 % — SIGNIFICANT CHANGE UP (ref 10.3–14.5)
RBC # FLD: 13.9 % — SIGNIFICANT CHANGE UP (ref 10.3–14.5)
SODIUM SERPL-SCNC: 139 MMOL/L — SIGNIFICANT CHANGE UP (ref 135–145)
SODIUM SERPL-SCNC: 139 MMOL/L — SIGNIFICANT CHANGE UP (ref 135–145)
WBC # BLD: 7.76 K/UL — SIGNIFICANT CHANGE UP (ref 3.8–10.5)
WBC # BLD: 7.76 K/UL — SIGNIFICANT CHANGE UP (ref 3.8–10.5)
WBC # FLD AUTO: 7.76 K/UL — SIGNIFICANT CHANGE UP (ref 3.8–10.5)
WBC # FLD AUTO: 7.76 K/UL — SIGNIFICANT CHANGE UP (ref 3.8–10.5)

## 2023-10-30 PROCEDURE — 80048 BASIC METABOLIC PNL TOTAL CA: CPT

## 2023-10-30 PROCEDURE — G0463: CPT

## 2023-10-30 PROCEDURE — 85027 COMPLETE CBC AUTOMATED: CPT

## 2023-10-30 PROCEDURE — 85610 PROTHROMBIN TIME: CPT

## 2023-10-30 RX ORDER — WARFARIN SODIUM 2.5 MG/1
0 TABLET ORAL
Qty: 0 | Refills: 0 | DISCHARGE

## 2023-10-30 NOTE — H&P PST ADULT - HEART RATE (BEATS/MIN)
[facility]  [address]  [place] [address]  Phone: [phone]  Fax: Miguelina Barros        February 22, 2022     Patient: Ever Wolf   YOB: 2006   Date of Visit: 2/22/2022       To Whom it May Concern:    Nhan Olivares was seen in my clinic on 2/22/2022. He may return to school on 2/24/22. Please excuse him from missing school on 2/22 and 2/23. If you have any questions or concerns, please don't hesitate to call. Sincerely,         Jevon Mejia.  SHAUNNA Piper
75

## 2023-10-30 NOTE — H&P PST ADULT - ASSESSMENT
DASI score: 7.5  DASI activity: performs ADLs, walks, does house work   Loose teeth or dentures: Denies  Airway: MP2

## 2023-10-30 NOTE — H&P PST ADULT - HISTORY OF PRESENT ILLNESS
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 S/P IR for angiogram internal hemorrhoidal embolization 1 year ago. s/p 3 prior treatments with sclerotherapy.  Pt with continued rectal pain and bleeding.  Plan for hemorrhoid embolization on 11/9/23 with Dr. Halaibeh.

## 2023-10-30 NOTE — H&P PST ADULT - PROBLEM SELECTOR PLAN 1
Report given to 2a RN Plan for hemorrhoid embolization on 11/9/23 with Dr. Halaibeh.     PST labs sent per protocol  Pre procedure surgical scrub instructions discussed  Pre-op education provided - all questions answered

## 2023-11-08 DIAGNOSIS — Z01.818 ENCOUNTER FOR OTHER PREPROCEDURAL EXAMINATION: ICD-10-CM

## 2023-11-09 ENCOUNTER — LABORATORY RESULT (OUTPATIENT)
Age: 63
End: 2023-11-09

## 2023-11-09 ENCOUNTER — RESULT REVIEW (OUTPATIENT)
Age: 63
End: 2023-11-09

## 2023-11-09 ENCOUNTER — TRANSCRIPTION ENCOUNTER (OUTPATIENT)
Age: 63
End: 2023-11-09

## 2023-11-09 ENCOUNTER — OUTPATIENT (OUTPATIENT)
Dept: OUTPATIENT SERVICES | Facility: HOSPITAL | Age: 63
LOS: 1 days | End: 2023-11-09
Payer: COMMERCIAL

## 2023-11-09 VITALS
HEIGHT: 68 IN | OXYGEN SATURATION: 95 % | DIASTOLIC BLOOD PRESSURE: 70 MMHG | RESPIRATION RATE: 18 BRPM | SYSTOLIC BLOOD PRESSURE: 131 MMHG | WEIGHT: 210.1 LBS | TEMPERATURE: 98 F | HEART RATE: 71 BPM

## 2023-11-09 VITALS
OXYGEN SATURATION: 94 % | RESPIRATION RATE: 18 BRPM | SYSTOLIC BLOOD PRESSURE: 100 MMHG | HEART RATE: 62 BPM | DIASTOLIC BLOOD PRESSURE: 84 MMHG

## 2023-11-09 DIAGNOSIS — K64.9 UNSPECIFIED HEMORRHOIDS: ICD-10-CM

## 2023-11-09 DIAGNOSIS — K64.8 OTHER HEMORRHOIDS: ICD-10-CM

## 2023-11-09 DIAGNOSIS — Z98.890 OTHER SPECIFIED POSTPROCEDURAL STATES: Chronic | ICD-10-CM

## 2023-11-09 DIAGNOSIS — Z95.2 PRESENCE OF PROSTHETIC HEART VALVE: Chronic | ICD-10-CM

## 2023-11-09 LAB
BLD GP AB SCN SERPL QL: NEGATIVE — SIGNIFICANT CHANGE UP
BLD GP AB SCN SERPL QL: NEGATIVE — SIGNIFICANT CHANGE UP
RH IG SCN BLD-IMP: POSITIVE — SIGNIFICANT CHANGE UP
RH IG SCN BLD-IMP: POSITIVE — SIGNIFICANT CHANGE UP

## 2023-11-09 PROCEDURE — 36248 INS CATH ABD/L-EXT ART ADDL: CPT

## 2023-11-09 PROCEDURE — 86901 BLOOD TYPING SEROLOGIC RH(D): CPT

## 2023-11-09 PROCEDURE — 76937 US GUIDE VASCULAR ACCESS: CPT

## 2023-11-09 PROCEDURE — 37244 VASC EMBOLIZE/OCCLUDE BLEED: CPT

## 2023-11-09 PROCEDURE — 36247 INS CATH ABD/L-EXT ART 3RD: CPT

## 2023-11-09 PROCEDURE — 86900 BLOOD TYPING SEROLOGIC ABO: CPT

## 2023-11-09 PROCEDURE — 76937 US GUIDE VASCULAR ACCESS: CPT | Mod: 26

## 2023-11-09 PROCEDURE — C1887: CPT

## 2023-11-09 PROCEDURE — C1889: CPT

## 2023-11-09 PROCEDURE — C1769: CPT

## 2023-11-09 PROCEDURE — C1894: CPT

## 2023-11-09 PROCEDURE — C1760: CPT

## 2023-11-09 PROCEDURE — 86850 RBC ANTIBODY SCREEN: CPT

## 2023-11-09 NOTE — PROCEDURE NOTE - PROCEDURE FINDINGS AND DETAILS
Angiography demonstrates recanalization of the hemorrhoidal cushions. Successful glue embolization of the hemorrhoidal artery.   Left groin closed with angioseal.

## 2023-11-09 NOTE — ASU DISCHARGE PLAN (ADULT/PEDIATRIC) - NURSING INSTRUCTIONS
Please feel free to contact us at [phone] if any problems arise. After 6PM, Monday through Friday, on weekends and on holidays, please call [phone] and ask for the radiology resident on call to be paged.

## 2023-11-09 NOTE — ASU DISCHARGE PLAN (ADULT/PEDIATRIC) - ASU DC SPECIAL INSTRUCTIONSFT
Hemorroidal  Embolization    You underwent a Hemorroidal  Embolization in Interventional Radiology (IR) with Dr. Halaibeh     You may experience Post Embolization Syndrome for the first 5-7 days which may include nausea, vomiting, severe crampy pelvic pain & a low grade fever (below 101 F).                                                                              There may be a small area of bruising around the right groin site - this is normal.                                  Passage of clot, debris or tissue through the vagina may occur for the first few weeks to months & your menstrual period may be irregular for the first 3 cycles - this is normal.    To minimize the risk of infection, avoid introducing anything into the vagina and do not engage in sexual intercourse, swimming, douching, tub bathing, and do not use tampons for the next 6 weeks.                                                                                                                                                                                                                           DischargeMedications:                Follow Up Instructions:   -Please call the IR Office at [phone] to schedule a one month post op visit with your IR Doctor.     Should you have any questions or concerns regarding the above, please call the IR Nurse Practitioner at [phone] or [phone] Monday - Friday 8 am - 4 pm.

## 2023-11-09 NOTE — ASU DISCHARGE PLAN (ADULT/PEDIATRIC) - NS MD DC FALL RISK RISK
For information on Fall & Injury Prevention, visit: [email or web address] OR  [email or web address] OR  https://www.cdc.gov/steadi/patient.html

## 2023-11-09 NOTE — PRE PROCEDURE NOTE - PRE PROCEDURE EVALUATION
61 YOF with recurrent bleeding from hermoroids despite prior embolization as patient is on blood thinner. Evaluated pt at bedside. No changes in health and is a good candidate for the procedure.

## 2023-12-12 ENCOUNTER — APPOINTMENT (OUTPATIENT)
Dept: INTERVENTIONAL RADIOLOGY/VASCULAR | Facility: CLINIC | Age: 63
End: 2023-12-12
Payer: COMMERCIAL

## 2023-12-12 DIAGNOSIS — K62.89 OTHER SPECIFIED DISEASES OF ANUS AND RECTUM: ICD-10-CM

## 2023-12-12 DIAGNOSIS — Z79.01 LONG TERM (CURRENT) USE OF ANTICOAGULANTS: ICD-10-CM

## 2023-12-12 PROCEDURE — 99214 OFFICE O/P EST MOD 30 MIN: CPT | Mod: 95

## 2024-01-31 ENCOUNTER — APPOINTMENT (OUTPATIENT)
Dept: SURGERY | Facility: CLINIC | Age: 64
End: 2024-01-31
Payer: COMMERCIAL

## 2024-01-31 VITALS
RESPIRATION RATE: 18 BRPM | BODY MASS INDEX: 29.25 KG/M2 | SYSTOLIC BLOOD PRESSURE: 136 MMHG | OXYGEN SATURATION: 95 % | HEART RATE: 73 BPM | DIASTOLIC BLOOD PRESSURE: 78 MMHG | TEMPERATURE: 98.7 F | HEIGHT: 68 IN | WEIGHT: 193 LBS

## 2024-01-31 DIAGNOSIS — K64.8 OTHER HEMORRHOIDS: ICD-10-CM

## 2024-01-31 DIAGNOSIS — Z95.2 PRESENCE OF PROSTHETIC HEART VALVE: ICD-10-CM

## 2024-01-31 DIAGNOSIS — K64.3 FOURTH DEGREE HEMORRHOIDS: ICD-10-CM

## 2024-01-31 DIAGNOSIS — K62.3 RECTAL PROLAPSE: ICD-10-CM

## 2024-01-31 DIAGNOSIS — K64.4 RESIDUAL HEMORRHOIDAL SKIN TAGS: ICD-10-CM

## 2024-01-31 PROCEDURE — 99214 OFFICE O/P EST MOD 30 MIN: CPT

## 2024-01-31 RX ORDER — ATORVASTATIN CALCIUM 10 MG/1
10 TABLET, FILM COATED ORAL
Refills: 0 | Status: DISCONTINUED | COMMUNITY
End: 2024-01-31

## 2024-01-31 RX ORDER — ATORVASTATIN CALCIUM 20 MG/1
20 TABLET, FILM COATED ORAL
Refills: 0 | Status: ACTIVE | COMMUNITY

## 2024-01-31 NOTE — ASSESSMENT
[FreeTextEntry1] : Patient has failed sclerotherapy x 3 and hemorrhoid artery embolization x 2.  She continues to bleed daily and needs to wear a pad in her underwear for bleeding.  However, she has not required transfusions or intravenous iron.  The patient states that she is extremely uncomfortable with the bleeding.  I reviewed indications, risks, benefits, alternatives for hemorrhoidectomy including but not limited to an extremely high risk of postoperative bleeding, infection, pain, tags, recurrence, fissure, fistula, incontinence and stenosis.  The risk of life-threatening bleeding was discussed the need for potential emergency room visit not at the hospital where the surgery was performed was also discussed.  The patient will find out from her cardiologist how long she can be off of anticoagulation.  She understands that the risk of severe bleeding continues for 2 to 3 weeks after surgery.  She will make a decision regarding surgery and she will contact my office

## 2024-01-31 NOTE — HISTORY OF PRESENT ILLNESS
[FreeTextEntry1] : Urszula is a 63 year old female here for a follow up visit.  s/p sclerotherapy 09/01/22, 09/29/22, 03/29/23 05/05/2022 hemorrhoid artery embolization.  Colonoscopy on 2/22/21 by Dr. Ugo Carr. - Rectal prolapse, distal 5 mm sessile hyperplastic polyp of size 5 mm. Polypectomy done by cold snare. Proximal: 1 cm sessile, flat hyperplastic polyp biopsy taken. Site tattooed x2cc. Flat 3mm hyperplastic polyp, excision biopsy at 15 cm. Flat polyp 2 mm, excision biopsy at 5 cm. FIrst degree internal hemorrhoids. Sessile 3 mm polyp in the sigmoid. Pedunculated 6 mm polyp in the descending colon. Pathology; A. rectum, distal, polyp, polypectomy- hyperplastic polyp. B. colon, descending polyp, polypectomy- tubular adenoma. C. colon, sigmoid, polyp, excision biopsy- mucosal polyp with hyperplastic changes, no adenomatous changes seen. D. Rectum, proximal, biopsy- hyperplastic polyp. E. Rectum, polyp at 15 cm from anal verge, excision biopsy- hyperplastic polyp. F. Rectum, polyp 5 cm from anal verge, excision- hyperplastic polyp.  Last seen on 9/20/23 - Patient continues to have bleeding and swelling with severe discomfort. She has failed 1 trial of hemorrhoid ligation by interventional radiology. She has also failed sclerotherapy x3. I recommended a repeat trial of IR embolization because she has a mechanical aortic valve and would need anticoagulation early on after surgery. The risk of severe bleeding continues for 2 to 3 weeks after the operation and her risk would be high. If a second trial of IR embolization is unsuccessful then surgery would be needed understanding the high risk.  s/p hemorrhoidal artery embolization on 11/9/23.   Today pt reports no pain. Daily BMs, formed, no straining, takes colace daily, some pain/burning/itching with prolapsing tissue, daily bleeding dripping into bowl, no episodes of incontinence, and does feel prolapsed tissue. Takes baby aspirin and warfarin, hx of artificial heart valve placement.

## 2024-03-29 ENCOUNTER — OUTPATIENT (OUTPATIENT)
Dept: OUTPATIENT SERVICES | Facility: HOSPITAL | Age: 64
LOS: 1 days | End: 2024-03-29
Payer: COMMERCIAL

## 2024-03-29 VITALS
WEIGHT: 203.05 LBS | OXYGEN SATURATION: 96 % | SYSTOLIC BLOOD PRESSURE: 125 MMHG | TEMPERATURE: 98 F | RESPIRATION RATE: 18 BRPM | HEART RATE: 67 BPM | DIASTOLIC BLOOD PRESSURE: 76 MMHG | HEIGHT: 68 IN

## 2024-03-29 DIAGNOSIS — K64.3 FOURTH DEGREE HEMORRHOIDS: ICD-10-CM

## 2024-03-29 DIAGNOSIS — Z95.2 PRESENCE OF PROSTHETIC HEART VALVE: ICD-10-CM

## 2024-03-29 DIAGNOSIS — Z98.890 OTHER SPECIFIED POSTPROCEDURAL STATES: Chronic | ICD-10-CM

## 2024-03-29 DIAGNOSIS — Z98.890 OTHER SPECIFIED POSTPROCEDURAL STATES: ICD-10-CM

## 2024-03-29 DIAGNOSIS — K64.8 OTHER HEMORRHOIDS: ICD-10-CM

## 2024-03-29 DIAGNOSIS — Z95.2 PRESENCE OF PROSTHETIC HEART VALVE: Chronic | ICD-10-CM

## 2024-03-29 DIAGNOSIS — Z01.818 ENCOUNTER FOR OTHER PREPROCEDURAL EXAMINATION: ICD-10-CM

## 2024-03-29 DIAGNOSIS — K64.9 UNSPECIFIED HEMORRHOIDS: ICD-10-CM

## 2024-03-29 LAB
ANION GAP SERPL CALC-SCNC: 12 MMOL/L — SIGNIFICANT CHANGE UP (ref 5–17)
APTT BLD: 57.5 SEC — HIGH (ref 24.5–35.6)
BLD GP AB SCN SERPL QL: NEGATIVE — SIGNIFICANT CHANGE UP
BUN SERPL-MCNC: 19 MG/DL — SIGNIFICANT CHANGE UP (ref 7–23)
CALCIUM SERPL-MCNC: 9.9 MG/DL — SIGNIFICANT CHANGE UP (ref 8.4–10.5)
CHLORIDE SERPL-SCNC: 102 MMOL/L — SIGNIFICANT CHANGE UP (ref 96–108)
CO2 SERPL-SCNC: 27 MMOL/L — SIGNIFICANT CHANGE UP (ref 22–31)
CREAT SERPL-MCNC: 0.75 MG/DL — SIGNIFICANT CHANGE UP (ref 0.5–1.3)
EGFR: 89 ML/MIN/1.73M2 — SIGNIFICANT CHANGE UP
GLUCOSE SERPL-MCNC: 108 MG/DL — HIGH (ref 70–99)
HCT VFR BLD CALC: 46.3 % — HIGH (ref 34.5–45)
HGB BLD-MCNC: 14.2 G/DL — SIGNIFICANT CHANGE UP (ref 11.5–15.5)
INR BLD: 2.6 RATIO — HIGH (ref 0.85–1.18)
MCHC RBC-ENTMCNC: 28 PG — SIGNIFICANT CHANGE UP (ref 27–34)
MCHC RBC-ENTMCNC: 30.7 GM/DL — LOW (ref 32–36)
MCV RBC AUTO: 91.1 FL — SIGNIFICANT CHANGE UP (ref 80–100)
NRBC # BLD: 0 /100 WBCS — SIGNIFICANT CHANGE UP (ref 0–0)
PLATELET # BLD AUTO: 257 K/UL — SIGNIFICANT CHANGE UP (ref 150–400)
POTASSIUM SERPL-MCNC: 4.2 MMOL/L — SIGNIFICANT CHANGE UP (ref 3.5–5.3)
POTASSIUM SERPL-SCNC: 4.2 MMOL/L — SIGNIFICANT CHANGE UP (ref 3.5–5.3)
PROTHROM AB SERPL-ACNC: 27.8 SEC — HIGH (ref 9.5–13)
RBC # BLD: 5.08 M/UL — SIGNIFICANT CHANGE UP (ref 3.8–5.2)
RBC # FLD: 13.8 % — SIGNIFICANT CHANGE UP (ref 10.3–14.5)
RH IG SCN BLD-IMP: POSITIVE — SIGNIFICANT CHANGE UP
SODIUM SERPL-SCNC: 141 MMOL/L — SIGNIFICANT CHANGE UP (ref 135–145)
WBC # BLD: 6.74 K/UL — SIGNIFICANT CHANGE UP (ref 3.8–10.5)
WBC # FLD AUTO: 6.74 K/UL — SIGNIFICANT CHANGE UP (ref 3.8–10.5)

## 2024-03-29 PROCEDURE — 85027 COMPLETE CBC AUTOMATED: CPT

## 2024-03-29 PROCEDURE — 85730 THROMBOPLASTIN TIME PARTIAL: CPT

## 2024-03-29 PROCEDURE — G0463: CPT

## 2024-03-29 PROCEDURE — 86900 BLOOD TYPING SEROLOGIC ABO: CPT

## 2024-03-29 PROCEDURE — 86901 BLOOD TYPING SEROLOGIC RH(D): CPT

## 2024-03-29 PROCEDURE — 80048 BASIC METABOLIC PNL TOTAL CA: CPT

## 2024-03-29 PROCEDURE — 85610 PROTHROMBIN TIME: CPT

## 2024-03-29 PROCEDURE — 86850 RBC ANTIBODY SCREEN: CPT

## 2024-03-29 RX ORDER — LIDOCAINE HCL 20 MG/ML
0.2 VIAL (ML) INJECTION ONCE
Refills: 0 | Status: DISCONTINUED | OUTPATIENT
Start: 2024-04-15 | End: 2024-04-15

## 2024-03-29 RX ORDER — SODIUM CHLORIDE 9 MG/ML
3 INJECTION INTRAMUSCULAR; INTRAVENOUS; SUBCUTANEOUS EVERY 8 HOURS
Refills: 0 | Status: DISCONTINUED | OUTPATIENT
Start: 2024-04-15 | End: 2024-04-15

## 2024-03-29 RX ORDER — CEFOTETAN DISODIUM 1 G
2 VIAL (EA) INJECTION ONCE
Refills: 0 | Status: DISCONTINUED | OUTPATIENT
Start: 2024-04-15 | End: 2024-04-16

## 2024-03-29 NOTE — H&P PST ADULT - NSICDXPROCEDURE_GEN_ALL_CORE_FT
PROCEDURES:  Submucosal hemorrhoidectomy 29-Mar-2024 14:07:35 fourth degree hemorrhoids Vignesh Leo

## 2024-03-29 NOTE — H&P PST ADULT - PROBLEM SELECTOR PLAN 1
Plan- scheduled for Hemorrhoidectomy/ mucosal proctoplasty   preop instruction provided in writing and verbally, patient verbalized understanding and teach back   T&S done

## 2024-03-29 NOTE — H&P PST ADULT - PROBLEM SELECTOR PLAN 2
patient to see cardiologist about bridging with lovenox prior to surgery   cardiac evaluation report on lovenox instruction to obtain, also most recent copy of echo and ekg  patient to continue aspirin aylin-op

## 2024-03-29 NOTE — H&P PST ADULT - NSICDXPASTMEDICALHX_GEN_ALL_CORE_FT
PAST MEDICAL HISTORY:  Anxiety     Cardiac murmur     Dense breasts     H/O aortic valve stenosis     Hemorrhoids grade 4 internal prolapsed    History of snoring     Hyperlipidemia     Obesity     Osteopenia     Precancerous lesion Polyp removed colon 2/2021    S/P ascending aortic aneurysm repair     Vision loss right eye, sees peripheral vision only

## 2024-03-29 NOTE — H&P PST ADULT - HISTORY OF PRESENT ILLNESS
63 year old female with h/o aortic valves stenosis/ ascending aortic aneurysm s/p AVR (prosthetic valve)/ ascending aortic root and arch replacement  5/17/2021 (on warfarin), hypertension, hyperlipidemia, anxiety disorders, obesity, osteopenia, presents to [facility] for scheduled Hemorrhoidectomy/ mucosal proctoplasty for grade 4 internal prolapse on 4/15/2024, reports had 2 previous hemorrhoid embolization (right side 4/5/2022 with vascular closure device implanted, and left side on 11/9/2023), sclerotherapy x 3(8/22, 9/2022, 3/2023), and laser hemorrhoidectomy at age 31, and still experiencing bleeding and pain.     ***Patient is scheduled to see cardiologist on 4/8/2024 for bridging with lovenox prior to surgery.  T&S done    no gum bleeding/no nose bleeds/no throat pain/no dysphagia

## 2024-04-14 ENCOUNTER — TRANSCRIPTION ENCOUNTER (OUTPATIENT)
Age: 64
End: 2024-04-14

## 2024-04-15 ENCOUNTER — INPATIENT (INPATIENT)
Facility: HOSPITAL | Age: 64
LOS: 0 days | Discharge: HOME | DRG: 331 | End: 2024-04-16
Payer: COMMERCIAL

## 2024-04-15 ENCOUNTER — APPOINTMENT (OUTPATIENT)
Dept: SURGERY | Facility: HOSPITAL | Age: 64
End: 2024-04-15
Payer: COMMERCIAL

## 2024-04-15 VITALS
WEIGHT: 203.05 LBS | RESPIRATION RATE: 16 BRPM | HEIGHT: 67.99 IN | OXYGEN SATURATION: 96 % | DIASTOLIC BLOOD PRESSURE: 73 MMHG | TEMPERATURE: 98 F | SYSTOLIC BLOOD PRESSURE: 122 MMHG | HEART RATE: 70 BPM

## 2024-04-15 DIAGNOSIS — Z98.890 OTHER SPECIFIED POSTPROCEDURAL STATES: Chronic | ICD-10-CM

## 2024-04-15 DIAGNOSIS — Z98.890 OTHER SPECIFIED POSTPROCEDURAL STATES: ICD-10-CM

## 2024-04-15 DIAGNOSIS — Z95.2 PRESENCE OF PROSTHETIC HEART VALVE: ICD-10-CM

## 2024-04-15 DIAGNOSIS — K64.8 OTHER HEMORRHOIDS: ICD-10-CM

## 2024-04-15 DIAGNOSIS — K64.3 FOURTH DEGREE HEMORRHOIDS: ICD-10-CM

## 2024-04-15 DIAGNOSIS — Z95.2 PRESENCE OF PROSTHETIC HEART VALVE: Chronic | ICD-10-CM

## 2024-04-15 PROCEDURE — 45505 REPAIR OF RECTUM: CPT | Mod: 59

## 2024-04-15 PROCEDURE — 46260 REMOVE IN/EX HEM GROUPS 2+: CPT

## 2024-04-15 DEVICE — SURGICEL FIBRILLAR 2 X 4": Type: IMPLANTABLE DEVICE | Status: FUNCTIONAL

## 2024-04-15 RX ORDER — METOPROLOL TARTRATE 50 MG
200 TABLET ORAL DAILY
Refills: 0 | Status: DISCONTINUED | OUTPATIENT
Start: 2024-04-15 | End: 2024-04-16

## 2024-04-15 RX ORDER — SERTRALINE 25 MG/1
50 TABLET, FILM COATED ORAL DAILY
Refills: 0 | Status: DISCONTINUED | OUTPATIENT
Start: 2024-04-15 | End: 2024-04-16

## 2024-04-15 RX ORDER — AMLODIPINE BESYLATE 2.5 MG/1
1 TABLET ORAL
Qty: 0 | Refills: 0 | DISCHARGE

## 2024-04-15 RX ORDER — ENOXAPARIN SODIUM 100 MG/ML
90 INJECTION SUBCUTANEOUS EVERY 12 HOURS
Refills: 0 | Status: DISCONTINUED | OUTPATIENT
Start: 2024-04-16 | End: 2024-04-16

## 2024-04-15 RX ORDER — METRONIDAZOLE 7.5 MG/G
1 GEL VAGINAL
Refills: 0 | Status: DISCONTINUED | OUTPATIENT
Start: 2024-04-15 | End: 2024-04-16

## 2024-04-15 RX ORDER — POLYETHYLENE GLYCOL 3350 17 G/17G
17 POWDER, FOR SOLUTION ORAL
Refills: 0 | Status: DISCONTINUED | OUTPATIENT
Start: 2024-04-16 | End: 2024-04-16

## 2024-04-15 RX ORDER — ACETAMINOPHEN 500 MG
1000 TABLET ORAL ONCE
Refills: 0 | Status: COMPLETED | OUTPATIENT
Start: 2024-04-15 | End: 2024-04-15

## 2024-04-15 RX ORDER — ONDANSETRON 8 MG/1
4 TABLET, FILM COATED ORAL ONCE
Refills: 0 | Status: DISCONTINUED | OUTPATIENT
Start: 2024-04-15 | End: 2024-04-15

## 2024-04-15 RX ORDER — SERTRALINE 25 MG/1
1 TABLET, FILM COATED ORAL
Qty: 0 | Refills: 0 | DISCHARGE

## 2024-04-15 RX ORDER — ACETAMINOPHEN 500 MG
1000 TABLET ORAL ONCE
Refills: 0 | Status: DISCONTINUED | OUTPATIENT
Start: 2024-04-16 | End: 2024-04-16

## 2024-04-15 RX ORDER — ACETAMINOPHEN 500 MG
1000 TABLET ORAL ONCE
Refills: 0 | Status: DISCONTINUED | OUTPATIENT
Start: 2024-04-15 | End: 2024-04-15

## 2024-04-15 RX ORDER — ATORVASTATIN CALCIUM 80 MG/1
10 TABLET, FILM COATED ORAL AT BEDTIME
Refills: 0 | Status: DISCONTINUED | OUTPATIENT
Start: 2024-04-15 | End: 2024-04-16

## 2024-04-15 RX ORDER — METRONIDAZOLE 7.5 MG/G
1 GEL VAGINAL DAILY
Refills: 0 | Status: DISCONTINUED | OUTPATIENT
Start: 2024-04-15 | End: 2024-04-15

## 2024-04-15 RX ORDER — LISINOPRIL 2.5 MG/1
1 TABLET ORAL
Qty: 0 | Refills: 0 | DISCHARGE

## 2024-04-15 RX ORDER — METOPROLOL TARTRATE 50 MG
1 TABLET ORAL
Qty: 0 | Refills: 0 | DISCHARGE

## 2024-04-15 RX ORDER — ATORVASTATIN CALCIUM 80 MG/1
0 TABLET, FILM COATED ORAL
Qty: 0 | Refills: 0 | DISCHARGE

## 2024-04-15 RX ORDER — HYDROMORPHONE HYDROCHLORIDE 2 MG/ML
0.25 INJECTION INTRAMUSCULAR; INTRAVENOUS; SUBCUTANEOUS
Refills: 0 | Status: DISCONTINUED | OUTPATIENT
Start: 2024-04-15 | End: 2024-04-15

## 2024-04-15 RX ORDER — TIZANIDINE 4 MG/1
2 TABLET ORAL
Refills: 0 | DISCHARGE

## 2024-04-15 RX ORDER — DOCUSATE SODIUM 100 MG
1 CAPSULE ORAL
Refills: 0 | DISCHARGE

## 2024-04-15 RX ORDER — ASPIRIN/CALCIUM CARB/MAGNESIUM 324 MG
81 TABLET ORAL DAILY
Refills: 0 | Status: DISCONTINUED | OUTPATIENT
Start: 2024-04-16 | End: 2024-04-16

## 2024-04-15 RX ORDER — OXYCODONE HYDROCHLORIDE 5 MG/1
5 TABLET ORAL EVERY 6 HOURS
Refills: 0 | Status: DISCONTINUED | OUTPATIENT
Start: 2024-04-15 | End: 2024-04-16

## 2024-04-15 RX ORDER — LISINOPRIL 2.5 MG/1
5 TABLET ORAL DAILY
Refills: 0 | Status: DISCONTINUED | OUTPATIENT
Start: 2024-04-15 | End: 2024-04-16

## 2024-04-15 RX ORDER — CHOLECALCIFEROL (VITAMIN D3) 125 MCG
0 CAPSULE ORAL
Qty: 0 | Refills: 0 | DISCHARGE

## 2024-04-15 RX ORDER — AMLODIPINE BESYLATE 2.5 MG/1
5 TABLET ORAL DAILY
Refills: 0 | Status: DISCONTINUED | OUTPATIENT
Start: 2024-04-15 | End: 2024-04-16

## 2024-04-15 RX ORDER — ZINC SULFATE TAB 220 MG (50 MG ZINC EQUIVALENT) 220 (50 ZN) MG
1 TAB ORAL
Qty: 0 | Refills: 0 | DISCHARGE

## 2024-04-15 RX ORDER — ASPIRIN/CALCIUM CARB/MAGNESIUM 324 MG
0 TABLET ORAL
Qty: 0 | Refills: 0 | DISCHARGE

## 2024-04-15 RX ORDER — SODIUM CHLORIDE 9 MG/ML
3 INJECTION INTRAMUSCULAR; INTRAVENOUS; SUBCUTANEOUS EVERY 8 HOURS
Refills: 0 | Status: DISCONTINUED | OUTPATIENT
Start: 2024-04-15 | End: 2024-04-16

## 2024-04-15 RX ORDER — AZELASTINE 137 UG/1
2 SPRAY, METERED NASAL
Refills: 0 | DISCHARGE

## 2024-04-15 RX ADMIN — METRONIDAZOLE 1 APPLICATION(S): 7.5 GEL VAGINAL at 22:21

## 2024-04-15 RX ADMIN — Medication 1000 MILLIGRAM(S): at 23:00

## 2024-04-15 RX ADMIN — ATORVASTATIN CALCIUM 10 MILLIGRAM(S): 80 TABLET, FILM COATED ORAL at 22:43

## 2024-04-15 RX ADMIN — Medication 400 MILLIGRAM(S): at 22:21

## 2024-04-15 RX ADMIN — SODIUM CHLORIDE 3 MILLILITER(S): 9 INJECTION INTRAMUSCULAR; INTRAVENOUS; SUBCUTANEOUS at 22:00

## 2024-04-15 NOTE — ASU PATIENT PROFILE, ADULT - MEDICATION ADMINISTRATION INFO, PROFILE
Bed: 15  Expected date:   Expected time:   Means of arrival:   Comments:  Kody 54/m fluid leaking from abd   no concerns

## 2024-04-15 NOTE — CHART NOTE - NSCHARTNOTEFT_GEN_A_CORE
Surgery Post-Op Note    Procedure: Submucosal hemorrhoidectomy    PACU labs/imaging for follow-up: none    Subjective:   Pt seen and examined at the bedside. Pt w/ no complaints. Denies F/C/N/V. Pain controlled with medication.     Vital Signs Last 24 Hrs  T(C): 36.6 (15 Apr 2024 19:58), Max: 36.6 (15 Apr 2024 13:03)  T(F): 97.8 (15 Apr 2024 19:58), Max: 97.9 (15 Apr 2024 13:03)  HR: 63 (15 Apr 2024 19:58) (59 - 72)  BP: 108/65 (15 Apr 2024 19:58) (93/50 - 131/60)  BP(mean): 78 (15 Apr 2024 17:45) (68 - 87)  RR: 17 (15 Apr 2024 19:58) (16 - 18)  SpO2: 94% (15 Apr 2024 19:58) (93% - 100%)    Parameters below as of 15 Apr 2024 19:58  Patient On (Oxygen Delivery Method): room air        Physical Exam:  General: NAD, resting comfortably in bed  Neuro: A/O x 3, no focal deficits  Pulmonary: airway intact, nonlabored breathing  Cardiovascular: NSR  Abdominal: soft, non-tender, non-distended, perianal dressing removed, non-bloody, c/d/i  Extremities: WWP          LABS:            CAPILLARY BLOOD GLUCOSE                  Radiology and Additional Studies:    Assessment:63y Female s/p above procedure    Plan:  IVF, Diet: LRD  ASA and LVX full dose  Sitz bath and metrogel   Pain/nausea control PRN  Incentive spirometer/OOB/Ambulate      Green Surgery       Ephraim Mei PGY1

## 2024-04-15 NOTE — PATIENT PROFILE ADULT - FALL HARM RISK - HARM RISK INTERVENTIONS

## 2024-04-15 NOTE — ASU PREOP CHECKLIST - SKIN PREP
Telephone Encounter by Yashira Perez CMA at 11/20/18 12:49 PM     Author:  Yashira Perez CMA Service:  (none) Author Type:  Certified Medical Assistant     Filed:  11/20/18 12:51 PM Encounter Date:  10/31/2018 Status:  Signed     :  Yashira Perez CMA (Certified Medical Assistant)            Left message on answering machine to call back.[JG1.1T]  As of right now in the new Epic I have dates below available.  Please check dates and schedule patient for a new patient consultation.    Dec 5, 2018 3:20PM  Dec 6, 2018 9:50AM or 10:20AM[JG1.1M]      Revision History        User Key Date/Time User Provider Type Action    > JG1.1 11/20/18 12:51 PM Yashira Perez CMA Certified Medical Assistant Sign    M - Manual, T - Template             n/a

## 2024-04-15 NOTE — ASU PATIENT PROFILE, ADULT - NS SC CAGE ALCOHOL ANNOYED YOU
S-(situation): 4 hour update    B-(background): Right TKA    A-(assessment): Pt ambulated to bathroom with 1 assist. Voided 400 mL. No complaints of pain. Sitting up in recliner. CMS intact.     R-(recommendations): Will continue to monitor.      no

## 2024-04-15 NOTE — PRE-ANESTHESIA EVALUATION ADULT - NSANTHPMHFT_GEN_ALL_CORE
s/p mechanical AVR, REPAIR OF ASC AORTA TRANSVERSE FIRST ARCH REPLACEMENT (FOR AR)  HTN  CORONARY ARTERY CALCIFICATION  ECHO 3/2023  EF-70-75%, IMPAIRED RELAXATION, MECH AOTIC VALVE  WAS ON COUMADIN CHANGED TO LOVENOX. LAST DOSE 24 HRS AGO  DENIES GERD  DENIES RECENT COUGH, COLD, FEVER

## 2024-04-16 ENCOUNTER — TRANSCRIPTION ENCOUNTER (OUTPATIENT)
Age: 64
End: 2024-04-16

## 2024-04-16 ENCOUNTER — RESULT REVIEW (OUTPATIENT)
Age: 64
End: 2024-04-16

## 2024-04-16 VITALS
RESPIRATION RATE: 18 BRPM | HEART RATE: 73 BPM | OXYGEN SATURATION: 94 % | DIASTOLIC BLOOD PRESSURE: 72 MMHG | SYSTOLIC BLOOD PRESSURE: 134 MMHG | TEMPERATURE: 98 F

## 2024-04-16 LAB
ANION GAP SERPL CALC-SCNC: 10 MMOL/L — SIGNIFICANT CHANGE UP (ref 5–17)
BUN SERPL-MCNC: 18 MG/DL — SIGNIFICANT CHANGE UP (ref 7–23)
CALCIUM SERPL-MCNC: 9.7 MG/DL — SIGNIFICANT CHANGE UP (ref 8.4–10.5)
CHLORIDE SERPL-SCNC: 104 MMOL/L — SIGNIFICANT CHANGE UP (ref 96–108)
CO2 SERPL-SCNC: 24 MMOL/L — SIGNIFICANT CHANGE UP (ref 22–31)
CREAT SERPL-MCNC: 0.8 MG/DL — SIGNIFICANT CHANGE UP (ref 0.5–1.3)
EGFR: 83 ML/MIN/1.73M2 — SIGNIFICANT CHANGE UP
GLUCOSE SERPL-MCNC: 107 MG/DL — HIGH (ref 70–99)
HCT VFR BLD CALC: 41.5 % — SIGNIFICANT CHANGE UP (ref 34.5–45)
HGB BLD-MCNC: 12.9 G/DL — SIGNIFICANT CHANGE UP (ref 11.5–15.5)
MAGNESIUM SERPL-MCNC: 2 MG/DL — SIGNIFICANT CHANGE UP (ref 1.6–2.6)
MCHC RBC-ENTMCNC: 28.4 PG — SIGNIFICANT CHANGE UP (ref 27–34)
MCHC RBC-ENTMCNC: 31.1 GM/DL — LOW (ref 32–36)
MCV RBC AUTO: 91.4 FL — SIGNIFICANT CHANGE UP (ref 80–100)
NRBC # BLD: 0 /100 WBCS — SIGNIFICANT CHANGE UP (ref 0–0)
PHOSPHATE SERPL-MCNC: 3.1 MG/DL — SIGNIFICANT CHANGE UP (ref 2.5–4.5)
PLATELET # BLD AUTO: 143 K/UL — LOW (ref 150–400)
POTASSIUM SERPL-MCNC: 4 MMOL/L — SIGNIFICANT CHANGE UP (ref 3.5–5.3)
POTASSIUM SERPL-SCNC: 4 MMOL/L — SIGNIFICANT CHANGE UP (ref 3.5–5.3)
RBC # BLD: 4.54 M/UL — SIGNIFICANT CHANGE UP (ref 3.8–5.2)
RBC # FLD: 14.6 % — HIGH (ref 10.3–14.5)
SODIUM SERPL-SCNC: 138 MMOL/L — SIGNIFICANT CHANGE UP (ref 135–145)
WBC # BLD: 8.25 K/UL — SIGNIFICANT CHANGE UP (ref 3.8–10.5)
WBC # FLD AUTO: 8.25 K/UL — SIGNIFICANT CHANGE UP (ref 3.8–10.5)

## 2024-04-16 PROCEDURE — 84100 ASSAY OF PHOSPHORUS: CPT

## 2024-04-16 PROCEDURE — 85027 COMPLETE CBC AUTOMATED: CPT

## 2024-04-16 PROCEDURE — 36415 COLL VENOUS BLD VENIPUNCTURE: CPT

## 2024-04-16 PROCEDURE — C1889: CPT

## 2024-04-16 PROCEDURE — 88304 TISSUE EXAM BY PATHOLOGIST: CPT

## 2024-04-16 PROCEDURE — 88304 TISSUE EXAM BY PATHOLOGIST: CPT | Mod: 26

## 2024-04-16 PROCEDURE — 83735 ASSAY OF MAGNESIUM: CPT

## 2024-04-16 PROCEDURE — 80048 BASIC METABOLIC PNL TOTAL CA: CPT

## 2024-04-16 RX ORDER — WARFARIN SODIUM 2.5 MG/1
1 TABLET ORAL
Refills: 0 | DISCHARGE

## 2024-04-16 RX ORDER — ENOXAPARIN SODIUM 100 MG/ML
90 INJECTION SUBCUTANEOUS
Qty: 28 | Refills: 0
Start: 2024-04-16 | End: 2024-04-29

## 2024-04-16 RX ORDER — OXYCODONE HYDROCHLORIDE 5 MG/1
1 TABLET ORAL
Qty: 5 | Refills: 0
Start: 2024-04-16

## 2024-04-16 RX ORDER — ACETAMINOPHEN 500 MG
2 TABLET ORAL
Qty: 0 | Refills: 0 | DISCHARGE
Start: 2024-04-16

## 2024-04-16 RX ORDER — ACETAMINOPHEN 500 MG
650 TABLET ORAL EVERY 6 HOURS
Refills: 0 | Status: DISCONTINUED | OUTPATIENT
Start: 2024-04-16 | End: 2024-04-16

## 2024-04-16 RX ORDER — METRONIDAZOLE 7.5 MG/G
1 GEL VAGINAL
Qty: 1 | Refills: 0
Start: 2024-04-16

## 2024-04-16 RX ADMIN — METRONIDAZOLE 1 APPLICATION(S): 7.5 GEL VAGINAL at 11:35

## 2024-04-16 RX ADMIN — AMLODIPINE BESYLATE 5 MILLIGRAM(S): 2.5 TABLET ORAL at 05:51

## 2024-04-16 RX ADMIN — Medication 650 MILLIGRAM(S): at 12:33

## 2024-04-16 RX ADMIN — LISINOPRIL 5 MILLIGRAM(S): 2.5 TABLET ORAL at 05:50

## 2024-04-16 RX ADMIN — ENOXAPARIN SODIUM 90 MILLIGRAM(S): 100 INJECTION SUBCUTANEOUS at 03:32

## 2024-04-16 RX ADMIN — Medication 200 MILLIGRAM(S): at 05:51

## 2024-04-16 RX ADMIN — SERTRALINE 50 MILLIGRAM(S): 25 TABLET, FILM COATED ORAL at 11:34

## 2024-04-16 RX ADMIN — OXYCODONE HYDROCHLORIDE 5 MILLIGRAM(S): 5 TABLET ORAL at 03:33

## 2024-04-16 RX ADMIN — SODIUM CHLORIDE 3 MILLILITER(S): 9 INJECTION INTRAMUSCULAR; INTRAVENOUS; SUBCUTANEOUS at 06:57

## 2024-04-16 RX ADMIN — OXYCODONE HYDROCHLORIDE 5 MILLIGRAM(S): 5 TABLET ORAL at 09:45

## 2024-04-16 RX ADMIN — Medication 81 MILLIGRAM(S): at 11:34

## 2024-04-16 RX ADMIN — OXYCODONE HYDROCHLORIDE 5 MILLIGRAM(S): 5 TABLET ORAL at 04:30

## 2024-04-16 RX ADMIN — Medication 650 MILLIGRAM(S): at 05:50

## 2024-04-16 RX ADMIN — POLYETHYLENE GLYCOL 3350 17 GRAM(S): 17 POWDER, FOR SOLUTION ORAL at 08:53

## 2024-04-16 RX ADMIN — OXYCODONE HYDROCHLORIDE 5 MILLIGRAM(S): 5 TABLET ORAL at 08:52

## 2024-04-16 RX ADMIN — Medication 650 MILLIGRAM(S): at 06:30

## 2024-04-16 RX ADMIN — SODIUM CHLORIDE 3 MILLILITER(S): 9 INJECTION INTRAMUSCULAR; INTRAVENOUS; SUBCUTANEOUS at 12:37

## 2024-04-16 RX ADMIN — Medication 650 MILLIGRAM(S): at 11:34

## 2024-04-16 RX ADMIN — METRONIDAZOLE 1 APPLICATION(S): 7.5 GEL VAGINAL at 05:51

## 2024-04-16 RX ADMIN — POLYETHYLENE GLYCOL 3350 17 GRAM(S): 17 POWDER, FOR SOLUTION ORAL at 11:34

## 2024-04-16 NOTE — DISCHARGE NOTE PROVIDER - CARE PROVIDERS DIRECT ADDRESSES
,[email or web address]

## 2024-04-16 NOTE — DISCHARGE NOTE PROVIDER - NSDCMRMEDTOKEN_GEN_ALL_CORE_FT
amLODIPine 5 mg oral tablet: 1 tab(s) orally once a day  aspirin 81 mg oral tablet: orally once a day  atorvastatin 10 mg oral tablet: orally once a day (at bedtime)  azelastine 137 mcg/inh (0.1%) nasal spray: 2 spray(s) intranasally 2 times a day  Colace 100 mg oral capsule: 1 cap(s) orally  lisinopril 5 mg oral tablet: 1 tab(s) orally once a day  metoprolol succinate 200 mg oral tablet, extended release: 1 tab(s) orally once a day  sertraline 50 mg oral tablet: 1 tab(s) orally once a day  tiZANidine 2 mg oral tablet: 2 tab(s) orally 3 times a day as needed for  moderate pain  vitamin D3  1000IU  daily:   Vitamin D3 1000 intl units (25 mcg) oral tablet:   warfarin 2.5 mg oral tablet: 1 tab(s) orally 3 times a week Mon, Wed, Sat  warfarin 5 mg oral tablet: 1 tab(s) orally 4 times a week Sun, Tues, Th, Fri  Zinc 140 mg (as elemental zinc 50 mg) oral tablet: 1 tab(s) orally once a day  zinc 50 mg oral daily:    acetaminophen 325 mg oral tablet: 2 tab(s) orally every 6 hours  amLODIPine 5 mg oral tablet: 1 tab(s) orally once a day  aspirin 81 mg oral tablet: orally once a day  atorvastatin 10 mg oral tablet: orally once a day (at bedtime)  azelastine 137 mcg/inh (0.1%) nasal spray: 2 spray(s) intranasally 2 times a day  Colace 100 mg oral capsule: 1 cap(s) orally  lisinopril 5 mg oral tablet: 1 tab(s) orally once a day  metoprolol succinate 200 mg oral tablet, extended release: 1 tab(s) orally once a day  sertraline 50 mg oral tablet: 1 tab(s) orally once a day  tiZANidine 2 mg oral tablet: 2 tab(s) orally 3 times a day as needed for  moderate pain  vitamin D3  1000IU  daily:   Vitamin D3 1000 intl units (25 mcg) oral tablet:   warfarin 2.5 mg oral tablet: 1 tab(s) orally 3 times a week Mon, Wed, Sat  warfarin 5 mg oral tablet: 1 tab(s) orally 4 times a week Sun, Tues, Th, Fri  Zinc 140 mg (as elemental zinc 50 mg) oral tablet: 1 tab(s) orally once a day  zinc 50 mg oral daily:

## 2024-04-16 NOTE — DISCHARGE NOTE PROVIDER - CARE PROVIDER_API CALL
Reese Maciel  Surgery  [address] [place] [address], [facility] [address]  [place] [address]  Phone: [phone]  Fax: [phone]  Follow Up Time: 1 week   Reese Maciel  Surgery  [address] [place] [address], [address]  [place] [address]  Phone: [phone]  Fax: [phone]  Follow Up Time: 1 week    Outpatient Cardiology,   Phone: (   )    -  Fax: (   )    -  Follow Up Time: 1 week

## 2024-04-16 NOTE — DISCHARGE NOTE PROVIDER - PROVIDER TOKENS
PROVIDER:[TOKEN:[[id number]],FOLLOWUP:[1 week]] PROVIDER:[TOKEN:[[id number]],FOLLOWUP:[1 week]],FREE:[LAST:[Outpatient Cardiology],PHONE:[(   )    -],FAX:[(   )    -],FOLLOWUP:[1 week]]

## 2024-04-16 NOTE — DISCHARGE NOTE NURSING/CASE MANAGEMENT/SOCIAL WORK - NSDCPEFALRISK_GEN_ALL_CORE
For information on Fall & Injury Prevention, visit: [email or web address] OR  [email or web address] OR  https://www.cdc.gov/steadi/patient.html

## 2024-04-16 NOTE — PROGRESS NOTE ADULT - ATTENDING COMMENTS
Doing well.  Lovenox restarted.  No bleeding, wounds clean.  DC home with transition to coumadin next week at home.  Patient aware of high risk of bleeding up to 2-3 weeks after surgery.  Post op instructions discussed

## 2024-04-16 NOTE — DISCHARGE NOTE PROVIDER - HOSPITAL COURSE
Underwent hemorrhoidectomy without complication and was transferred to the PACU. When PACU criteria was met, patient transferred to the floor for observation. Postop, patient pain well controlled. Diet advanced as tolerated. Ambulation and incentive spirometry encouraged. Labs trended. Home medications restarted. Lovenox given for AC (home dose coumadin held). Cleared for discharge with outpatient follow up.

## 2024-04-16 NOTE — DISCHARGE NOTE NURSING/CASE MANAGEMENT/SOCIAL WORK - PATIENT PORTAL LINK FT
You can access the FollowMyHealth Patient Portal offered by [facility] by registering at the following website: [email or web address]. By joining [employer]’s FollowMyHealth portal, you will also be able to view your health information using other applications (apps) compatible with our system.

## 2024-04-16 NOTE — DISCHARGE NOTE NURSING/CASE MANAGEMENT/SOCIAL WORK - NSDCPELOVENOXREACT_GEN_ALL_CORE
Enoxaparin/Lovenox increases your risk for bleeding. Notify your doctor if you experience any of the following side effects: unusual bleeding or bruising, coughing up or vomiting blood, red or black stool, blood in your urine, itching or hives, chest tightness, chest pain, shortness of breath, trouble breathing, swelling in your face or hands, swelling in your mouth or throat, fever, large flat blue or purplish patches on the skin, numbness or weakness in your arm or leg on one side, pain in your lower leg, sudden or severe headache with vision, speech or walking problems. When Enoxaparin/Lovenox is taken with other medicines, they can affect how it works. Taking other medications such as aspirin, blood thinners, and nonsteroidal anti-inflammatories increases your risk of bleeding. It is very important to tell your health care provider about all of the other medicines, including over-the-counter medications, herbs, and vitamins you are taking. DO NOT start, stop, or change the dosage of any medicine, including over-the-counter medicines, vitamins, and herbal products without your doctor’s approval. Any products containing aspirin or are nonsteroidal anti-inflammatories lessen the blood’s ability to form clots and adds to the effect of Enoxaparin/Lovenox. Never take aspirin or medicines that contain aspirin without speaking to your doctor.
General

## 2024-04-16 NOTE — PROGRESS NOTE ADULT - ASSESSMENT
63 year old female with h/o aortic valves stenosis/ ascending aortic aneurysm s/p AVR (prosthetic valve)/ ascending aortic root and arch replacement  5/17/2021 (on warfarin), hypertension, hyperlipidemia, 2 previous hemorrhoid embolization (right side 4/5/2022 with vascular closure device implanted, and left side on 11/9/2023), sclerotherapy x 3(8/22, 9/2022, 3/2023), and laser hemorrhoidectomy, now s/p hemorrhoidectomy 4/15.     Plan:  IVF, Diet: LRD  ASA and LVX full dose starting this am  Sitz bath and metrogel   Pain/nausea control PRN  Incentive spirometer/OOB/Ambulate      [facility]       Ephraim Mei PGY1. 63 year old female with h/o aortic valves stenosis/ ascending aortic aneurysm s/p AVR (prosthetic valve)/ ascending aortic root and arch replacement  5/17/2021 (on warfarin), hypertension, hyperlipidemia, 2 previous hemorrhoid embolization (right side 4/5/2022 with vascular closure device implanted, and left side on 11/9/2023), sclerotherapy x 3(8/22, 9/2022, 3/2023), and laser hemorrhoidectomy, now s/p hemorrhoidectomy 4/15.     Plan:  IVF, Diet: LRD  ASA and LVX full dose starting this am, will need bridge to home coumadin  Sitz bath and metrogel   Pain/nausea control PRN  Incentive spirometer/OOB/Ambulate  D/c planning    [facility]  p [phone]  63 year old female with h/o aortic valves stenosis/ ascending aortic aneurysm s/p AVR (prosthetic valve)/ ascending aortic root and arch replacement  5/17/2021 (on warfarin), hypertension, hyperlipidemia, 2 previous hemorrhoid embolization (right side 4/5/2022 with vascular closure device implanted, and left side on 11/9/2023), sclerotherapy x 3(8/22, 9/2022, 3/2023), and laser hemorrhoidectomy, now s/p hemorrhoidectomy 4/15.     Plan:  IVF, Diet: LRD  ASA and LVX full dose starting this am, will need bridge to home coumadin as outpatient  Sitz bath and metrogel   Pain/nausea control PRN  Incentive spirometer/OOB/Ambulate  D/c planning    Green Surgery  p [phone]

## 2024-04-16 NOTE — DISCHARGE NOTE PROVIDER - NSDCCPCAREPLAN_GEN_ALL_CORE_FT
PRINCIPAL DISCHARGE DIAGNOSIS  Diagnosis: S/P hemorrhoidectomy  Assessment and Plan of Treatment: Please continue following instructions per print out. Continue with Lovenox injections x 14 days or until otherwise advised by Dr. Maciel. DO NOT restart your home dose of coumadin at this time.   BATHING: No swimming pools, no hot tubs, no tub baths. You may shower and/or sponge bathe. Let soapy water run over incisions/wounds.  ACTIVITY: No heavy lifting more than 10-15lbs or straining. Otherwise, you may return to your usual level of physical activity. You may complete your daily activities. Take frequent walks. If you are taking narcotic pain medication (such as Oxycodone), do NOT drive a car, operate machinery or make important decisions.  DIET: Low fiber diet as tolerated  NOTIFY YOUR SURGEON IF: You have any bleeding that does not stop, any pus draining from your wound, any fever (over 100.4 F) or chills, persistent nausea/vomiting with inability to tolerate food or liquids, persistent diarrhea, or if your pain is not controlled on your discharge pain medications.  FOLLOW-UP:  1. Please call to make a follow-up appointment with Dr. Maciel within one week of discharge   2. Please follow up with your primary care physician in one week regarding your hospitalization.     PRINCIPAL DISCHARGE DIAGNOSIS  Diagnosis: S/P hemorrhoidectomy  Assessment and Plan of Treatment: Please continue following instructions per print out. Continue with Lovenox injections x 7 days or until otherwise advised by Dr. Maciel. DO NOT restart your home dose of coumadin at this time. Please call your cardiologist to get instructions for transition back to coumadin.   BATHING: No swimming pools, no hot tubs, no tub baths. You may shower and/or sponge bathe. Let soapy water run over incisions/wounds.  ACTIVITY: No heavy lifting more than 10-15lbs or straining. Otherwise, you may return to your usual level of physical activity. You may complete your daily activities. Take frequent walks. If you are taking narcotic pain medication (such as Oxycodone), do NOT drive a car, operate machinery or make important decisions.  DIET: Low fiber diet as tolerated  NOTIFY YOUR SURGEON IF: You have any bleeding that does not stop, any pus draining from your wound, any fever (over 100.4 F) or chills, persistent nausea/vomiting with inability to tolerate food or liquids, persistent diarrhea, or if your pain is not controlled on your discharge pain medications.  FOLLOW-UP:  1. Please call to make a follow-up appointment with Dr. Maciel within one week of discharge   2. Please follow up with your primary care physician in one week regarding your hospitalization.

## 2024-04-18 PROBLEM — Z98.890 OTHER SPECIFIED POSTPROCEDURAL STATES: Chronic | Status: ACTIVE | Noted: 2024-03-29

## 2024-04-18 PROBLEM — Z87.898 PERSONAL HISTORY OF OTHER SPECIFIED CONDITIONS: Chronic | Status: ACTIVE | Noted: 2024-03-29

## 2024-04-18 PROBLEM — Z86.79 PERSONAL HISTORY OF OTHER DISEASES OF THE CIRCULATORY SYSTEM: Chronic | Status: ACTIVE | Noted: 2024-03-29

## 2024-04-24 LAB — SURGICAL PATHOLOGY STUDY: SIGNIFICANT CHANGE UP

## 2024-05-02 ENCOUNTER — APPOINTMENT (OUTPATIENT)
Dept: SURGERY | Facility: CLINIC | Age: 64
End: 2024-05-02
Payer: COMMERCIAL

## 2024-05-02 VITALS
TEMPERATURE: 96.8 F | RESPIRATION RATE: 17 BRPM | HEART RATE: 79 BPM | DIASTOLIC BLOOD PRESSURE: 73 MMHG | OXYGEN SATURATION: 97 % | SYSTOLIC BLOOD PRESSURE: 119 MMHG

## 2024-05-02 PROCEDURE — 99024 POSTOP FOLLOW-UP VISIT: CPT

## 2024-05-02 NOTE — ASSESSMENT
[FreeTextEntry1] : Patient doing well.  Continue local care.  Risk of bleeding in the next 1 to 2 weeks discussed.  Follow-up 6 weeks.

## 2024-05-02 NOTE — HISTORY OF PRESENT ILLNESS
[FreeTextEntry1] : Urszula is a 63 year old female here for a post-op visit  Colonoscopy on 2/22/21 by Dr. Ugo Carr. - Rectal prolapse, distal 5 mm sessile hyperplastic polyp of size 5 mm. Polypectomy done by cold snare. Proximal: 1 cm sessile, flat hyperplastic polyp biopsy taken. Site tattooed x2cc. Flat 3mm hyperplastic polyp, excision biopsy at 15 cm. Flat polyp 2 mm, excision biopsy at 5 cm. FIrst degree internal hemorrhoids. Sessile 3 mm polyp in the sigmoid. Pedunculated 6 mm polyp in the descending colon. Pathology; A. rectum, distal, polyp, polypectomy- hyperplastic polyp. B. colon, descending polyp, polypectomy- tubular adenoma. C. colon, sigmoid, polyp, excision biopsy- mucosal polyp with hyperplastic changes, no adenomatous changes seen. D. Rectum, proximal, biopsy- hyperplastic polyp. E. Rectum, polyp at 15 cm from anal verge, excision biopsy- hyperplastic polyp. F. Rectum, polyp 5 cm from anal verge, excision- hyperplastic polyp.  s/p hemorrhoid artery embolization 05/05/22; 11/09/23  s/p sclerotherapy 09/01/22; 09/29/22; 03/29/23  s/p Hemorrhoidectomy and mucosal proctoplasty on 04/15/24 for hemorrhoid and mucosal; prolapse, rectal bleeding, failure of non-operative management Pathology:  1. Anus, left lateral, hemorrhoid, hemorrhoidectomy - Squamous mucosa with submucosal dilated blood vessels, consistent with hemorrhoids 2. Anus, right posterior, hemorrhoid, hemorrhoidectomy - Squamous and colonic mucosa with submucosal dilated blood vessels, consistent with hemorrhoids  Today patient denies surgical incisional pain but sometimes with discomfort takes tylenol PRN, does sitz bath and using Metrogel.  BMs soft once daily takes Miralax and stool softener daily, no bleeding since friday 04/27/24.   Good appetite.   Denies nausea, vomiting, fever or chills.  Patient is on warfarin.

## 2024-05-10 NOTE — ASU PATIENT PROFILE, ADULT - NS PRO PT RIGHT SUPPORT PERSON
I will STOP taking the medications listed below when I get home from the hospital:    indomethacin 25 mg oral capsule  -- 1 cap(s) by mouth every 6 hours    progesterone 200 mg vaginal suppository  -- 1 suppository(ies) intravaginally once a day (at bedtime)  
same name as above

## 2024-06-13 ENCOUNTER — APPOINTMENT (OUTPATIENT)
Dept: SURGERY | Facility: CLINIC | Age: 64
End: 2024-06-13
Payer: COMMERCIAL

## 2024-06-13 VITALS
HEART RATE: 73 BPM | SYSTOLIC BLOOD PRESSURE: 111 MMHG | RESPIRATION RATE: 18 BRPM | WEIGHT: 195 LBS | BODY MASS INDEX: 29.55 KG/M2 | TEMPERATURE: 96.2 F | OXYGEN SATURATION: 97 % | DIASTOLIC BLOOD PRESSURE: 62 MMHG | HEIGHT: 68 IN

## 2024-06-13 DIAGNOSIS — Z09 ENCOUNTER FOR FOLLOW-UP EXAMINATION AFTER COMPLETED TREATMENT FOR CONDITIONS OTHER THAN MALIGNANT NEOPLASM: ICD-10-CM

## 2024-06-13 PROCEDURE — 99024 POSTOP FOLLOW-UP VISIT: CPT

## 2024-06-13 NOTE — HISTORY OF PRESENT ILLNESS
[FreeTextEntry1] : Urszula is a 63 year old female here for a follow up visit.    s/p Hemorrhoidectomy and mucosal proctoplasty on 04/15/24 for hemorrhoid and mucosal; prolapse, rectal bleeding, failure of non-operative management Pathology: 1. Anus, left lateral, hemorrhoid, hemorrhoidectomy - Squamous mucosa with submucosal dilated blood vessels, consistent with hemorrhoids 2. Anus, right posterior, hemorrhoid, hemorrhoidectomy - Squamous and colonic mucosa with submucosal dilated blood vessels, consistent with hemorrhoids  s/p hemorrhoid artery embolization 05/05/22; 11/09/23  s/p sclerotherapy 09/01/22; 09/29/22; 03/29/23  Colonoscopy on 2/22/21 by Dr. Ugo Carr. - Rectal prolapse, distal 5 mm sessile hyperplastic polyp of size 5 mm. Polypectomy done by cold snare. Proximal: 1 cm sessile, flat hyperplastic polyp biopsy taken. Site tattooed x2cc. Flat 3mm hyperplastic polyp, excision biopsy at 15 cm. Flat polyp 2 mm, excision biopsy at 5 cm. FIrst degree internal hemorrhoids. Sessile 3 mm polyp in the sigmoid. Pedunculated 6 mm polyp in the descending colon. Pathology; A. rectum, distal, polyp, polypectomy- hyperplastic polyp. B. colon, descending polyp, polypectomy- tubular adenoma. C. colon, sigmoid, polyp, excision biopsy- mucosal polyp with hyperplastic changes, no adenomatous changes seen. D. Rectum, proximal, biopsy- hyperplastic polyp. E. Rectum, polyp at 15 cm from anal verge, excision biopsy- hyperplastic polyp. F. Rectum, polyp 5 cm from anal verge, excision- hyperplastic polyp.  Today pt reports no pain. Daily BMs, formed, no straining, takes Colace daily, denies pain, no bleeding, no episodes of incontinence, and does feel swollen tissue. Denies use of creams/ointments. Taking warfarin and baby aspirin.

## 2024-06-13 NOTE — ASSESSMENT
[FreeTextEntry1] : Doing well.  No bleeding.  Has right posterior tag.  She will return in 6 months if the tag is still bothering her.  Possible office excision while on Coumadin.

## 2024-07-30 NOTE — H&P PST ADULT - BRAND OF COVID-19 VACCINATION
Quality 394b: Td/Tdap Immunizations For Adolescents: Patient had one tetanus, diphtheria toxoids and acellular pertussis vaccine (Tdap) on or between the patient's 10th and 13th birthdays. Detail Level: Detailed Quality 226: Preventive Care And Screening: Tobacco Use: Screening And Cessation Intervention: Patient screened for tobacco use and is an ex/non-smoker Quality 394a: Meningococcal Immunizations For Adolescents: Patient had one dose of meningococcal vaccine (serogroups A, C, W, Y) on or between the patient's 11th and 13th birthdays. Quality 431: Preventive Care And Screening: Unhealthy Alcohol Use - Screening: Patient not screened for unhealthy alcohol use using a systematic screening method Pfizer dose 1, 2, and 3

## 2024-08-15 NOTE — ASU PREOP CHECKLIST - HEIGHT IN CM
----- Message from Matilda DORAN RN sent at 8/14/2024  4:54 PM CDT -----  Please notify patient of stable test results, thanks!       Keith Singer, DO  8/14/2024  4:48 PM CDT       Please inform patient stable labs     She is feeling much better now that she is in sinus rhythm     CPM (continue present management, no changes)  Call for questions or concerns     Follow up as scheduled,  Thanks MN     
[employer] message sent.   
172.7

## 2024-10-09 NOTE — REASON FOR VISIT
"Patient has had 3 days of strong fishy vaginal odor with vaginal watery clear discharge. She had BV in past , used oral pill and gel in past. Patient prefers gel treatment. Denies any fever, no pain, no itching, no rash, no UTI symptoms.   [facility] on file is preferred.  LMP was 9/12/24 she may get period during this treatment time. We discussed home care and to call back if symptoms do not resolve after treatment for office appointment.   Patient verbalized understanding.    If patient has history of BV in prior two years, prescribe:    -Metrogel Intravaginally x 5 nights, with no refills.      Medication instructions:  Please instruct patient that they cannot drink alcohol while on Flagyl.    Please instruct patient that they should not have sex while on treatment or 3 days after if using Metrogel.    If patient is having recurrent BV infections, please schedule appointment (should be seen within 5 days).      Reason for Disposition   Symptoms of a yeast infection (i.e., itchy, white discharge, not bad smelling) and feels like prior vaginal yeast infections    Answer Assessment - Initial Assessment Questions  1. SYMPTOM: \"What's the main symptom you're concerned about?\" (e.g., pain, itching, dryness)      Vaginal odor   2. LOCATION: \"Where are the  symptoms located?\" (e.g., inside/outside, left/right)      vaginal  3. ONSET: \"When did the  symptoms  start?\"      3 days  4. PAIN: \"Is there any pain?\" If Yes, ask: \"How bad is it?\" (Scale: 1-10; mild, moderate, severe)      denies  5. ITCHING: \"Is there any itching?\" If Yes, ask: \"How bad is it?\" (Scale: 1-10; mild, moderate, severe)      denies  6. CAUSE: \"What do you think is causing the discharge?\" \"Have you had the same problem before? What happened then?\"      BV  7. OTHER SYMPTOMS: \"Do you have any other symptoms?\" (e.g., fever, itching, vaginal bleeding, pain with urination, injury to genital area, vaginal foreign body)      denies  8. PREGNANCY: \"Is " "there any chance you are pregnant?\" \"When was your last menstrual period?\"      LMP 9/12/24    Protocols used: Vaginal Symptoms-ADULT-[place]    " [Follow-Up: _____] : a [unfilled] follow-up visit

## 2025-01-16 NOTE — PROGRESS NOTE ADULT - SUBJECTIVE AND OBJECTIVE BOX
General Surgery Progress Note    Overnight: Dressing changed on POC c/d/i  Subjective: Patient seen and examined on rounds.    Objective:  Vitals:  T(C): 36.8 (04-16-24 @ 01:45), Max: 36.8 (04-15-24 @ 20:58)  HR: 69 (04-16-24 @ 01:45) (59 - 72)  BP: 127/77 (04-16-24 @ 01:45) (93/50 - 131/60)  RR: 17 (04-16-24 @ 01:45) (16 - 18)  SpO2: 95% (04-16-24 @ 01:45) (93% - 100%)  Wt(kg): --    04-15 @ 07:01  -  04-16 @ 03:05  --------------------------------------------------------  IN:    Oral Fluid: 240 mL  Total IN: 240 mL    OUT:    Voided (mL): 200 mL  Total OUT: 200 mL    Total NET: 40 mL          Physical Exam:  General: NAD, resting in bed comfortably  Neuro: A&Ox3, nonfocal, follows commands  Chest: airway intact, non-labored breathing  Cardiac: no JVD, palpable pulses b/l  Abd: soft, NTND, perianal dressing c/d/i  Extremities: WWP      Labs:                           General Surgery Progress Note    Overnight: Dressing changed on POC c/d/i  Subjective: Patient seen and examined on rounds. Patient feeling well without complaints.     Objective:  Vitals:  T(C): 36.8 (04-16-24 @ 01:45), Max: 36.8 (04-15-24 @ 20:58)  HR: 69 (04-16-24 @ 01:45) (59 - 72)  BP: 127/77 (04-16-24 @ 01:45) (93/50 - 131/60)  RR: 17 (04-16-24 @ 01:45) (16 - 18)  SpO2: 95% (04-16-24 @ 01:45) (93% - 100%)  Wt(kg): --    04-15 @ 07:01  -  04-16 @ 03:05  --------------------------------------------------------  IN:    Oral Fluid: 240 mL  Total IN: 240 mL    OUT:    Voided (mL): 200 mL  Total OUT: 200 mL    Total NET: 40 mL      Physical Exam:  General: NAD, resting in bed comfortably  Neuro: A&Ox3, nonfocal, follows commands  Chest: non-labored breathing  Abd: soft, NTND, perianal dressing c/d/i  Extremities: WWP     General Surgery Progress Note    Overnight: Dressing changed on POC c/d/i  Subjective: Patient seen and examined on rounds. Patient feeling well without complaints. Tolerating regular diet.     Objective:  Vitals:  T(C): 36.8 (04-16-24 @ 01:45), Max: 36.8 (04-15-24 @ 20:58)  HR: 69 (04-16-24 @ 01:45) (59 - 72)  BP: 127/77 (04-16-24 @ 01:45) (93/50 - 131/60)  RR: 17 (04-16-24 @ 01:45) (16 - 18)  SpO2: 95% (04-16-24 @ 01:45) (93% - 100%)  Wt(kg): --    04-15 @ 07:01  -  04-16 @ 03:05  --------------------------------------------------------  IN:    Oral Fluid: 240 mL  Total IN: 240 mL    OUT:    Voided (mL): 200 mL  Total OUT: 200 mL    Total NET: 40 mL      Physical Exam:  General: NAD, resting in bed comfortably  Neuro: A&Ox3, nonfocal, follows commands  Chest: non-labored breathing  Abd: soft, NTND, perianal dressing c/d/i  Extremities: WWP     General

## (undated) DEVICE — PREP BETADINE KIT

## (undated) DEVICE — ELCTR BOVIE PENCIL SMOKE EVACUATION

## (undated) DEVICE — DRAPE INSTRUMENT POUCH 6.75" X 11"

## (undated) DEVICE — TAPE SILK 3"

## (undated) DEVICE — POSITIONER PATIENT SAFETY STRAP 3X60"

## (undated) DEVICE — GLV 7.5 PROTEXIS (WHITE)

## (undated) DEVICE — NDL HYPO SAFE 30G X .5" (YELLOW)

## (undated) DEVICE — SUT POLYSORB 3-0 30" V-20 UNDYED

## (undated) DEVICE — LUBRICATING JELLY ONESHOT 1.25OZ

## (undated) DEVICE — VENODYNE/SCD SLEEVE CALF MEDIUM

## (undated) DEVICE — GLV 7 PROTEXIS (WHITE)

## (undated) DEVICE — LIGASURE SMALL JAW

## (undated) DEVICE — MEDICATION LABELS W MARKER

## (undated) DEVICE — WARMING BLANKET UPPER ADULT

## (undated) DEVICE — PACK MINOR

## (undated) DEVICE — DRSG MASTISOL

## (undated) DEVICE — DRSG TELFA 3 X 8

## (undated) DEVICE — SOL IRR POUR H2O 250ML

## (undated) DEVICE — SPECIMEN CONTAINER 100ML

## (undated) DEVICE — SUT CHROMIC 3-0 30" V-20

## (undated) DEVICE — DRSG COMBINE 5X9"

## (undated) DEVICE — DRAPE THYROID 77" X 123"

## (undated) DEVICE — SOL IRR POUR NS 0.9% 500ML

## (undated) DEVICE — POSITIONER FOAM EGG CRATE ULNAR 2PCS (PINK)